# Patient Record
Sex: FEMALE | Race: WHITE | NOT HISPANIC OR LATINO | Employment: OTHER | URBAN - METROPOLITAN AREA
[De-identification: names, ages, dates, MRNs, and addresses within clinical notes are randomized per-mention and may not be internally consistent; named-entity substitution may affect disease eponyms.]

---

## 2017-01-02 ENCOUNTER — OFFICE VISIT (OUTPATIENT)
Dept: URGENT CARE | Facility: CLINIC | Age: 57
End: 2017-01-02
Payer: COMMERCIAL

## 2017-01-02 VITALS
HEART RATE: 80 BPM | OXYGEN SATURATION: 98 % | SYSTOLIC BLOOD PRESSURE: 120 MMHG | RESPIRATION RATE: 16 BRPM | BODY MASS INDEX: 22.26 KG/M2 | TEMPERATURE: 99 F | WEIGHT: 159 LBS | HEIGHT: 71 IN | DIASTOLIC BLOOD PRESSURE: 90 MMHG

## 2017-01-02 DIAGNOSIS — R68.89 FLU-LIKE SYMPTOMS: ICD-10-CM

## 2017-01-02 LAB
FLUAV+FLUBV AG SPEC QL IA: NEGATIVE
INT CON NEG: NEGATIVE
INT CON NEG: NEGATIVE
INT CON POS: POSITIVE
INT CON POS: POSITIVE
S PYO AG THROAT QL: NEGATIVE

## 2017-01-02 PROCEDURE — 99214 OFFICE O/P EST MOD 30 MIN: CPT | Performed by: PHYSICIAN ASSISTANT

## 2017-01-02 PROCEDURE — 87880 STREP A ASSAY W/OPTIC: CPT | Performed by: PHYSICIAN ASSISTANT

## 2017-01-02 PROCEDURE — 87804 INFLUENZA ASSAY W/OPTIC: CPT | Performed by: PHYSICIAN ASSISTANT

## 2017-01-02 RX ORDER — OSELTAMIVIR PHOSPHATE 75 MG/1
75 CAPSULE ORAL 2 TIMES DAILY
Qty: 10 CAP | Refills: 0 | Status: SHIPPED | OUTPATIENT
Start: 2017-01-02 | End: 2017-01-07

## 2017-01-02 ASSESSMENT — ENCOUNTER SYMPTOMS
MYALGIAS: 1
DIZZINESS: 0
WHEEZING: 0
SHORTNESS OF BREATH: 0
SPUTUM PRODUCTION: 0
CONSTIPATION: 0
SORE THROAT: 1
FEVER: 1
CHILLS: 1
DIARRHEA: 0
NAUSEA: 1
VOMITING: 0
ABDOMINAL PAIN: 0
DIAPHORESIS: 1
HEADACHES: 0
COUGH: 1

## 2017-01-02 NOTE — MR AVS SNAPSHOT
"Radha Mcfarlane   2017 12:15 PM   Office Visit   MRN: [id number]    Department:  [facility]   Dept Phone:  [phone]    Description:  Female : 1960   Provider:  Patricia Duran PA-C           Reason for Visit     Fever sore throat,nasal drip,x2day       Allergies as of 2017     No Known Allergies      You were diagnosed with     Flu-like symptoms   [401769]         Vital Signs     Blood Pressure Pulse Temperature Respirations Height Weight    120/90 mmHg 80 37.2 °C (99 °F) 16 1.803 m (5' 11\") 72.122 kg (159 lb)    Body Mass Index Oxygen Saturation Smoking Status             22.19 kg/m2 98% Never Smoker          Basic Information     Date Of Birth Sex Race Ethnicity Preferred Language    1960 Female White Non- English      Problem List              ICD-10-CM Priority Class Noted - Resolved    Marfan syndrome (Chronic) Q87.40   2010 - Present    Abnormal EKG R94.31   10/30/2012 - Present    Incomplete RBBB I45.10   10/30/2012 - Present    Thickened endometrium R93.8   2016 - Present    Encounter for monitoring oral hormonal therapy for heavy menses Z51.81, Z79.3, N92.0   2016 - Present      Health Maintenance        Date Due Completion Dates    IMM DTaP/Tdap/Td Vaccine (1 - Tdap) 1980 ---    COLONOSCOPY 2011 ---    IMM INFLUENZA (1) 2016, 2014    MAMMOGRAM 2017, 2014, 10/30/2012, 2010    PAP SMEAR 2019            Results     POCT Rapid Strep A      Component    Rapid Strep Screen    Negative    Internal Control Positive    Positive    Internal Control Negative    Negative                        Current Immunizations     Influenza Vaccine Quad Inj (Pf) 2015 11:05 AM, 2014      Below and/or attached are the medications your provider expects you to take. Review all of your home medications and newly ordered medications with your provider and/or pharmacist. Follow medication " instructions as directed by your provider and/or pharmacist. Please keep your medication list with you and share with your provider. Update the information when medications are discontinued, doses are changed, or new medications (including over-the-counter products) are added; and carry medication information at all times in the event of emergency situations     Allergies:  No Known Allergies          Medications  Valid as of: January 02, 2017 -  1:35 PM    Generic Name Brand Name Tablet Size Instructions for use    Acetaminophen (Tab) TYLENOL 325 MG Take 650 mg by mouth Once. Prior to Iron Dextran infusion        Cyclobenzaprine HCl (Tab) FLEXERIL 10 MG Take 1 Tab by mouth 3 times a day as needed.        Hydrocod Polst-Chlorphen Polst (Suspension Extended Release) TUSSIONEX 10-8 MG/5ML Take 5 mL by mouth every 12 hours.        Hydrocodone-Acetaminophen (Tab) NORCO 5-325 MG Take 1-2 Tabs by mouth every 6 hours as needed ((Pain Scale 7-10)).        Ibuprofen (Tab) MOTRIN 200 MG 1-3 PO Q6H PRN pain        Oseltamivir Phosphate (Cap) TAMIFLU 75 MG Take 1 Cap by mouth 2 times a day for 5 days.        .                 Medicines prescribed today were sent to:     [facility]/PHARMACY #[id number] - [place] - [address] [place] [address]    Phone: [phone] Fax: [phone]    Open 24 Hours?: No      Medication refill instructions:       If your prescription bottle indicates you have medication refills left, it is not necessary to call your provider’s office. Please contact your pharmacy and they will refill your medication.    If your prescription bottle indicates you do not have any refills left, you may request refills at any time through one of the following ways: The online [employer] system (except Urgent Care), by calling your provider’s office, or by asking your pharmacy to contact your provider’s office with a refill request. Medication refills are processed only during regular business hours and may not be  available until the next business day. Your provider may request additional information or to have a follow-up visit with you prior to refilling your medication.   *Please Note: Medication refills are assigned a new Rx number when refilled electronically. Your pharmacy may indicate that no refills were authorized even though a new prescription for the same medication is available at the pharmacy. Please request the medicine by name with the pharmacy before contacting your provider for a refill.           [employer] Access Code: Activation code not generated  Current [employer] Status: Active

## 2017-01-03 NOTE — PROGRESS NOTES
"Subjective:      Radha Mcfarlane is a 56 y.o. female who presents with Fever            Fever   This is a new problem. The current episode started yesterday. The problem has been gradually improving. The maximum temperature noted was 101 to 101.9 F. Associated symptoms include congestion, coughing, muscle aches, nausea and a sore throat. Pertinent negatives include no abdominal pain, diarrhea, headaches, vomiting or wheezing. She has tried acetaminophen for the symptoms. The treatment provided mild relief.   Pt reports severe body aches and difficulty sleeping since yesterday. She states she has not had a flu shot this year.     Review of Systems   Constitutional: Positive for fever, chills, malaise/fatigue and diaphoresis.   HENT: Positive for congestion and sore throat.    Respiratory: Positive for cough. Negative for sputum production, shortness of breath and wheezing.    Gastrointestinal: Positive for nausea. Negative for vomiting, abdominal pain, diarrhea and constipation.   Musculoskeletal: Positive for myalgias.   Neurological: Negative for dizziness and headaches.          Objective:     /90 mmHg  Pulse 80  Temp(Src) 37.2 °C (99 °F)  Resp 16  Ht 1.803 m (5' 11\")  Wt 72.122 kg (159 lb)  BMI 22.19 kg/m2  SpO2 98%     Physical Exam   Constitutional: She is oriented to person, place, and time. She appears well-developed and well-nourished. No distress.   HENT:   Head: Normocephalic and atraumatic.   Right Ear: External ear normal.   Left Ear: External ear normal.   Nose: Nose normal.   Mouth/Throat: No oropharyngeal exudate.   Oropharynx erythematous   Eyes: Pupils are equal, round, and reactive to light.   Neck: Normal range of motion.   Cardiovascular: Normal rate, regular rhythm and normal heart sounds.    No murmur heard.  Pulmonary/Chest: Effort normal and breath sounds normal. No respiratory distress. She has no wheezes. She has no rales.   Musculoskeletal: Normal range of motion. "   Neurological: She is alert and oriented to person, place, and time.   Skin: Skin is warm and dry. She is not diaphoretic.   Psychiatric: She has a normal mood and affect. Her behavior is normal.   Nursing note and vitals reviewed.         Medications, Allergies, and current problem list reviewed today in Epic       Assessment/Plan:     1. Flu-like symptoms  - POCT Rapid Strep A: NEGATIVE  - POCT Influenza A/B: NEGATIVE  - Will treat for flu based off clinical presentation  - oseltamivir (TAMIFLU) 75 MG Cap; Take 1 Cap by mouth 2 times a day for 5 days.  Dispense: 10 Cap; Refill: 0  - Hydrocod Polst-CPM Polst ER (TUSSIONEX) 10-8 MG/5ML Suspension Extended Release; Take 5 mL by mouth every 12 hours.  Dispense: 140 mL; Refill: 0   - Will cause sedation, avoid driving, operating heavy machinery, and drinking alcohol  - Increased fluids and rest  - OTC tylenol or ibuprofen as needed for fever control  - Call or return to office if symptoms persist or worsen

## 2017-01-10 ENCOUNTER — OFFICE VISIT (OUTPATIENT)
Dept: URGENT CARE | Facility: CLINIC | Age: 57
End: 2017-01-10
Payer: COMMERCIAL

## 2017-01-10 ENCOUNTER — APPOINTMENT (OUTPATIENT)
Dept: RADIOLOGY | Facility: IMAGING CENTER | Age: 57
End: 2017-01-10
Attending: PHYSICIAN ASSISTANT
Payer: COMMERCIAL

## 2017-01-10 VITALS
TEMPERATURE: 98.2 F | DIASTOLIC BLOOD PRESSURE: 82 MMHG | WEIGHT: 156.6 LBS | RESPIRATION RATE: 16 BRPM | HEART RATE: 70 BPM | HEIGHT: 71 IN | OXYGEN SATURATION: 97 % | BODY MASS INDEX: 21.92 KG/M2 | SYSTOLIC BLOOD PRESSURE: 126 MMHG

## 2017-01-10 DIAGNOSIS — J06.9 UPPER RESPIRATORY TRACT INFECTION, UNSPECIFIED TYPE: ICD-10-CM

## 2017-01-10 DIAGNOSIS — R05.9 COUGH: ICD-10-CM

## 2017-01-10 PROCEDURE — 71020 DX-CHEST-2 VIEWS: CPT | Mod: TC | Performed by: PHYSICIAN ASSISTANT

## 2017-01-10 PROCEDURE — 99214 OFFICE O/P EST MOD 30 MIN: CPT | Performed by: PHYSICIAN ASSISTANT

## 2017-01-10 RX ORDER — AZITHROMYCIN 250 MG/1
TABLET, FILM COATED ORAL
Qty: 6 TAB | Refills: 0 | Status: SHIPPED | OUTPATIENT
Start: 2017-01-10 | End: 2017-05-31

## 2017-01-10 RX ORDER — BENZONATATE 100 MG/1
100 CAPSULE ORAL 3 TIMES DAILY PRN
Qty: 60 CAP | Refills: 0 | Status: SHIPPED | OUTPATIENT
Start: 2017-01-10 | End: 2017-05-31

## 2017-01-10 RX ORDER — ALBUTEROL SULFATE 90 UG/1
2 AEROSOL, METERED RESPIRATORY (INHALATION) EVERY 6 HOURS PRN
Qty: 8.5 G | Refills: 1 | Status: SHIPPED | OUTPATIENT
Start: 2017-01-10 | End: 2019-03-27

## 2017-01-10 ASSESSMENT — ENCOUNTER SYMPTOMS
COUGH: 1
SPUTUM PRODUCTION: 1
DIARRHEA: 0
CHILLS: 0
DIZZINESS: 0
SHORTNESS OF BREATH: 0
MUSCULOSKELETAL NEGATIVE: 1
SORE THROAT: 0
NAUSEA: 0
WHEEZING: 0
VOMITING: 0
FEVER: 0
ABDOMINAL PAIN: 0

## 2017-01-10 NOTE — MR AVS SNAPSHOT
"        Radha Mcfarlane   1/10/2017 3:15 PM   Office Visit   MRN: [id number]    Department:  [facility]   Dept Phone:  [phone]    Description:  Female : 1960   Provider:  Patricia Duran PA-C           Reason for Visit     Cough x was seen last monday for flu symptoms, everything has gone away but still has a bad cough and chest congestion, heavy colored mucus production      Allergies as of 1/10/2017     No Known Allergies      You were diagnosed with     Upper respiratory tract infection, unspecified type   [0739959]         Vital Signs     Blood Pressure Pulse Temperature Respirations Height Weight    126/82 mmHg 70 36.8 °C (98.2 °F) 16 1.803 m (5' 11\") 71.033 kg (156 lb 9.6 oz)    Body Mass Index Oxygen Saturation Smoking Status             21.85 kg/m2 97% Never Smoker          Basic Information     Date Of Birth Sex Race Ethnicity Preferred Language    1960 Female White Non- English      Problem List              ICD-10-CM Priority Class Noted - Resolved    Marfan syndrome (Chronic) Q87.40   2010 - Present    Abnormal EKG R94.31   10/30/2012 - Present    Incomplete RBBB I45.10   10/30/2012 - Present    Thickened endometrium R93.8   2016 - Present    Encounter for monitoring oral hormonal therapy for heavy menses Z51.81, Z79.3, N92.0   2016 - Present      Health Maintenance        Date Due Completion Dates    IMM DTaP/Tdap/Td Vaccine (1 - Tdap) 1980 ---    COLONOSCOPY 2011 ---    IMM INFLUENZA (1) 2016, 2014    MAMMOGRAM 2017, 2014, 10/30/2012, 2010    PAP SMEAR 2019            Current Immunizations     Influenza Vaccine Quad Inj (Pf) 2015 11:05 AM, 2014      Below and/or attached are the medications your provider expects you to take. Review all of your home medications and newly ordered medications with your provider and/or pharmacist. Follow medication instructions as directed by " your provider and/or pharmacist. Please keep your medication list with you and share with your provider. Update the information when medications are discontinued, doses are changed, or new medications (including over-the-counter products) are added; and carry medication information at all times in the event of emergency situations     Allergies:  No Known Allergies          Medications  Valid as of: January 10, 2017 -  4:22 PM    Generic Name Brand Name Tablet Size Instructions for use    Acetaminophen (Tab) TYLENOL 325 MG Take 650 mg by mouth Once. Prior to Iron Dextran infusion        Albuterol Sulfate (Aero Soln) albuterol 108 (90 BASE) MCG/ACT Inhale 2 Puffs by mouth every 6 hours as needed for Shortness of Breath.        Azithromycin (Tab) ZITHROMAX 250 MG Take as directed        Benzonatate (Cap) TESSALON 100 MG Take 1 Cap by mouth 3 times a day as needed for Cough.        Cyclobenzaprine HCl (Tab) FLEXERIL 10 MG Take 1 Tab by mouth 3 times a day as needed.        Hydrocod Polst-Chlorphen Polst (Suspension Extended Release) TUSSIONEX 10-8 MG/5ML Take 5 mL by mouth every 12 hours.        Hydrocodone-Acetaminophen (Tab) NORCO 5-325 MG Take 1-2 Tabs by mouth every 6 hours as needed ((Pain Scale 7-10)).        Ibuprofen (Tab) MOTRIN 200 MG 1-3 PO Q6H PRN pain        .                 Medicines prescribed today were sent to:     [facility]/PHARMACY #[id number] - [place] - [address] [place] [address]    Phone: [phone] Fax: [phone]    Open 24 Hours?: No      Medication refill instructions:       If your prescription bottle indicates you have medication refills left, it is not necessary to call your provider’s office. Please contact your pharmacy and they will refill your medication.    If your prescription bottle indicates you do not have any refills left, you may request refills at any time through one of the following ways: The online [employer] system (except Urgent Care), by calling your provider’s  office, or by asking your pharmacy to contact your provider’s office with a refill request. Medication refills are processed only during regular business hours and may not be available until the next business day. Your provider may request additional information or to have a follow-up visit with you prior to refilling your medication.   *Please Note: Medication refills are assigned a new Rx number when refilled electronically. Your pharmacy may indicate that no refills were authorized even though a new prescription for the same medication is available at the pharmacy. Please request the medicine by name with the pharmacy before contacting your provider for a refill.        Your To Do List     Future Labs/Procedures Complete By Expires    DX-CHEST-2 VIEWS  As directed 1/10/2018         [employer] Access Code: Activation code not generated  Current [employer] Status: Active

## 2017-01-11 NOTE — PROGRESS NOTES
"Subjective:      Radha Mcfarlane is a 57 y.o. female who presents with Cough            Cough  This is a new problem. The current episode started 1 to 4 weeks ago (1.5 weeks). The problem has been gradually worsening. The problem occurs every few minutes. The cough is productive of sputum (green). Pertinent negatives include no chest pain, chills, ear pain, fever, sore throat, shortness of breath or wheezing. The symptoms are aggravated by lying down. She has tried prescription cough suppressant for the symptoms. The treatment provided moderate relief. Her past medical history is significant for asthma. There is no history of pneumonia.   Pt was seen in urgent care 8 days ago for the same problem. She was having body aches, fever, and chills with a dry cough. She was treated empirically with Tamiflu and finished the rx as prescribed. States her symptoms of body aches and fever went away after a couple days, but her cough has gotten progressively worse and more productive. Denies recent fevers, SOB, or history of pneumonia.     Review of Systems   Constitutional: Negative for fever and chills.   HENT: Negative for ear pain and sore throat.    Respiratory: Positive for cough and sputum production. Negative for shortness of breath and wheezing.    Cardiovascular: Negative for chest pain.   Gastrointestinal: Negative for nausea, vomiting, abdominal pain and diarrhea.   Genitourinary: Negative.    Musculoskeletal: Negative.    Neurological: Negative for dizziness.          Objective:     /82 mmHg  Pulse 70  Temp(Src) 36.8 °C (98.2 °F)  Resp 16  Ht 1.803 m (5' 11\")  Wt 71.033 kg (156 lb 9.6 oz)  BMI 21.85 kg/m2  SpO2 97%     Physical Exam   Constitutional: She is oriented to person, place, and time. She appears well-developed and well-nourished. No distress.   HENT:   Head: Normocephalic and atraumatic.   Right Ear: External ear normal.   Left Ear: External ear normal.   Nose: Nose normal. "   Mouth/Throat: Oropharynx is clear and moist. No oropharyngeal exudate.   Eyes: Pupils are equal, round, and reactive to light. Right eye exhibits no discharge. Left eye exhibits no discharge.   Neck: Normal range of motion.   Cardiovascular: Normal rate, regular rhythm and normal heart sounds.    Pulmonary/Chest: Effort normal and breath sounds normal. No respiratory distress. She has no wheezes.   Musculoskeletal: Normal range of motion.   Neurological: She is alert and oriented to person, place, and time.   Skin: Skin is warm and dry. She is not diaphoretic.   Psychiatric: She has a normal mood and affect. Her behavior is normal.   Nursing note and vitals reviewed.         Medications, Allergies, and current problem list reviewed today in Epic       Assessment/Plan:     1. Upper respiratory tract infection, unspecified type  - DX-CHEST-2 VIEWS; Future   1.  No acute cardiopulmonary abnormality identified.   2.  Chronic obstructive pulmonary disease  - azithromycin (ZITHROMAX) 250 MG Tab; Take as directed  Dispense: 6 Tab; Refill: 0   - Complete full course of antibiotics as prescribed   - benzonatate (TESSALON) 100 MG Cap; Take 1 Cap by mouth 3 times a day as needed for Cough.  Dispense: 60 Cap; Refill: 0  - Call or return to office if symptoms persist or worsen

## 2017-02-04 ENCOUNTER — OFFICE VISIT (OUTPATIENT)
Dept: URGENT CARE | Facility: CLINIC | Age: 57
End: 2017-02-04
Payer: COMMERCIAL

## 2017-02-04 VITALS
DIASTOLIC BLOOD PRESSURE: 78 MMHG | HEART RATE: 71 BPM | OXYGEN SATURATION: 97 % | WEIGHT: 155 LBS | BODY MASS INDEX: 21.63 KG/M2 | SYSTOLIC BLOOD PRESSURE: 116 MMHG | RESPIRATION RATE: 16 BRPM | TEMPERATURE: 97.5 F

## 2017-02-04 DIAGNOSIS — J40 BRONCHITIS: ICD-10-CM

## 2017-02-04 DIAGNOSIS — J32.9 OTHER SINUSITIS: ICD-10-CM

## 2017-02-04 PROCEDURE — 99214 OFFICE O/P EST MOD 30 MIN: CPT | Performed by: PHYSICIAN ASSISTANT

## 2017-02-04 RX ORDER — MOXIFLOXACIN HYDROCHLORIDE 400 MG/1
400 TABLET ORAL DAILY
Qty: 10 TAB | Refills: 0 | Status: SHIPPED | OUTPATIENT
Start: 2017-02-04 | End: 2017-02-14

## 2017-02-04 ASSESSMENT — ENCOUNTER SYMPTOMS
CARDIOVASCULAR NEGATIVE: 1
COUGH: 1
SPUTUM PRODUCTION: 1
FEVER: 0
SHORTNESS OF BREATH: 0
SORE THROAT: 0
CONSTITUTIONAL NEGATIVE: 1
EYES NEGATIVE: 1
RHINORRHEA: 1

## 2017-02-04 NOTE — MR AVS SNAPSHOT
Radha Mcfarlane   2017 11:15 AM   Office Visit   MRN: [id number]    Department:  [facility]   Dept Phone:  [phone]    Description:  Female : 1960   Provider:  Sivakumar Galvan PA-C           Reason for Visit     Cough x 1 month, some productive cough    Sinus Problem x 1 month, nasal congestion, post nasal drip       Allergies as of 2017     No Known Allergies      You were diagnosed with     Bronchitis   [[id number]]       Other sinusitis   [[id number]]         Vital Signs     Blood Pressure Pulse Temperature Respirations Weight Oxygen Saturation    116/78 mmHg 71 36.4 °C (97.5 °F) 16 70.308 kg (155 lb) 97%    Smoking Status                   Never Smoker            Basic Information     Date Of Birth Sex Race Ethnicity Preferred Language    1960 Female White Non- English      Problem List              ICD-10-CM Priority Class Noted - Resolved    Marfan syndrome (Chronic) Q87.40   2010 - Present    Abnormal EKG R94.31   10/30/2012 - Present    Incomplete RBBB I45.10   10/30/2012 - Present    Thickened endometrium R93.8   2016 - Present    Encounter for monitoring oral hormonal therapy for heavy menses Z51.81, Z79.3, N92.0   2016 - Present      Health Maintenance        Date Due Completion Dates    IMM DTaP/Tdap/Td Vaccine (1 - Tdap) 1980 ---    COLONOSCOPY 2011 ---    IMM INFLUENZA (1) 2016, 2014    MAMMOGRAM 2017, 2014, 10/30/2012, 2010    PAP SMEAR 2019            Current Immunizations     Influenza Vaccine Quad Inj (Pf) 2015 11:05 AM, 2014      Below and/or attached are the medications your provider expects you to take. Review all of your home medications and newly ordered medications with your provider and/or pharmacist. Follow medication instructions as directed by your provider and/or pharmacist. Please keep your medication list with you and share with your provider. Update the  information when medications are discontinued, doses are changed, or new medications (including over-the-counter products) are added; and carry medication information at all times in the event of emergency situations     Allergies:  No Known Allergies          Medications  Valid as of: February 04, 2017 - 12:16 PM    Generic Name Brand Name Tablet Size Instructions for use    Acetaminophen (Tab) TYLENOL 325 MG Take 650 mg by mouth Once. Prior to Iron Dextran infusion        Albuterol Sulfate (Aero Soln) albuterol 108 (90 BASE) MCG/ACT Inhale 2 Puffs by mouth every 6 hours as needed for Shortness of Breath.        Azithromycin (Tab) ZITHROMAX 250 MG Take as directed        Benzonatate (Cap) TESSALON 100 MG Take 1 Cap by mouth 3 times a day as needed for Cough.        Cyclobenzaprine HCl (Tab) FLEXERIL 10 MG Take 1 Tab by mouth 3 times a day as needed.        Hydrocod Polst-Chlorphen Polst (Suspension Extended Release) TUSSIONEX 10-8 MG/5ML Take 5 mL by mouth every 12 hours.        Hydrocodone-Acetaminophen (Tab) NORCO 5-325 MG Take 1-2 Tabs by mouth every 6 hours as needed ((Pain Scale 7-10)).        Ibuprofen (Tab) MOTRIN 200 MG 1-3 PO Q6H PRN pain        .                 Medicines prescribed today were sent to:     [facility]/PHARMACY #[id number] - [place] - [address] [address]    Phone: [phone] Fax: [phone]    Open 24 Hours?: No      Medication refill instructions:       If your prescription bottle indicates you have medication refills left, it is not necessary to call your provider’s office. Please contact your pharmacy and they will refill your medication.    If your prescription bottle indicates you do not have any refills left, you may request refills at any time through one of the following ways: The online [employer] system (except Urgent Care), by calling your provider’s office, or by asking your pharmacy to contact your provider’s office with a refill request. Medication refills are  processed only during regular business hours and may not be available until the next business day. Your provider may request additional information or to have a follow-up visit with you prior to refilling your medication.   *Please Note: Medication refills are assigned a new Rx number when refilled electronically. Your pharmacy may indicate that no refills were authorized even though a new prescription for the same medication is available at the pharmacy. Please request the medicine by name with the pharmacy before contacting your provider for a refill.           [employer] Access Code: Activation code not generated  Current [employer] Status: Active

## 2017-02-04 NOTE — PROGRESS NOTES
Subjective:      Radha Mcfarlane is a 57 y.o. female who presents with Cough and Sinus Problem            Cough  This is a new problem. The current episode started in the past 7 days. The problem has been unchanged. The problem occurs every few minutes. The cough is productive of sputum. Associated symptoms include nasal congestion and rhinorrhea. Pertinent negatives include no fever, sore throat or shortness of breath. Nothing aggravates the symptoms. She has tried nothing for the symptoms. The treatment provided no relief. There is no history of asthma or environmental allergies.   Sinus Problem  Associated symptoms include congestion and coughing. Pertinent negatives include no shortness of breath or sore throat.       Review of Systems   Constitutional: Negative.  Negative for fever.   HENT: Positive for congestion and rhinorrhea. Negative for sore throat.    Eyes: Negative.    Respiratory: Positive for cough and sputum production. Negative for shortness of breath.    Cardiovascular: Negative.    Skin: Negative.    Endo/Heme/Allergies: Negative for environmental allergies.          Objective:     /78 mmHg  Pulse 71  Temp(Src) 36.4 °C (97.5 °F)  Resp 16  Wt 70.308 kg (155 lb)  SpO2 97%     Physical Exam   Constitutional: She is oriented to person, place, and time. She appears well-developed and well-nourished. No distress.   HENT:   Head: Normocephalic and atraumatic.   +maxill.sinus press.     Eyes: EOM are normal. Pupils are equal, round, and reactive to light.   Neck: Normal range of motion. Neck supple.   Cardiovascular: Normal rate.    Pulmonary/Chest: Effort normal and breath sounds normal. No respiratory distress. She has no wheezes. She has no rales.   Lymphadenopathy:     She has no cervical adenopathy.   Neurological: She is alert and oriented to person, place, and time.   Skin: Skin is warm and dry.   Psychiatric: She has a normal mood and affect. Her behavior is normal. Judgment and  thought content normal.   Nursing note and vitals reviewed.    Filed Vitals:    02/04/17 1136   BP: 116/78   Pulse: 71   Temp: 36.4 °C (97.5 °F)   Resp: 16   Weight: 70.308 kg (155 lb)   SpO2: 97%     Active Ambulatory Problems     Diagnosis Date Noted   • Marfan syndrome 09/16/2010   • Abnormal EKG 10/30/2012   • Incomplete RBBB 10/30/2012   • Thickened endometrium 04/29/2016   • Encounter for monitoring oral hormonal therapy for heavy menses 04/29/2016     Resolved Ambulatory Problems     Diagnosis Date Noted   • No Resolved Ambulatory Problems     Past Medical History   Diagnosis Date   • Asthma    • Breath shortness    • Anesthesia    • Gynecological disorder      Current Outpatient Prescriptions on File Prior to Visit   Medication Sig Dispense Refill   • azithromycin (ZITHROMAX) 250 MG Tab Take as directed 6 Tab 0   • benzonatate (TESSALON) 100 MG Cap Take 1 Cap by mouth 3 times a day as needed for Cough. 60 Cap 0   • albuterol 108 (90 BASE) MCG/ACT Aero Soln inhalation aerosol Inhale 2 Puffs by mouth every 6 hours as needed for Shortness of Breath. 8.5 g 1   • Hydrocod Polst-CPM Polst ER (TUSSIONEX) 10-8 MG/5ML Suspension Extended Release Take 5 mL by mouth every 12 hours. 140 mL 0   • hydrocodone-acetaminophen (NORCO) 5-325 MG Tab per tablet Take 1-2 Tabs by mouth every 6 hours as needed ((Pain Scale 7-10)). 20 Tab 0   • ibuprofen (MOTRIN) 200 MG Tab 1-3 PO Q6H PRN pain     • cyclobenzaprine (FLEXERIL) 10 MG Tab Take 1 Tab by mouth 3 times a day as needed. 30 Tab 0   • acetaminophen (TYLENOL) 325 MG TABS Take 650 mg by mouth Once. Prior to Iron Dextran infusion       No current facility-administered medications on file prior to visit.     Gargles, Cepacol lozenges, Aleve/Advil as needed for throat pain  Family History   Problem Relation Age of Onset   • Cancer Mother      Review of patient's allergies indicates no known allergies.              Assessment/Plan:     ·  bronchitis  · Sinusitis      · Avelox; otc  prn

## 2017-02-06 ENCOUNTER — OFFICE VISIT (OUTPATIENT)
Dept: INTERNAL MEDICINE | Facility: MEDICAL CENTER | Age: 57
End: 2017-02-06
Payer: COMMERCIAL

## 2017-02-06 VITALS
DIASTOLIC BLOOD PRESSURE: 84 MMHG | WEIGHT: 153.2 LBS | HEIGHT: 71 IN | OXYGEN SATURATION: 95 % | TEMPERATURE: 97.9 F | BODY MASS INDEX: 21.45 KG/M2 | SYSTOLIC BLOOD PRESSURE: 152 MMHG | HEART RATE: 73 BPM

## 2017-02-06 DIAGNOSIS — R05.8 POST-VIRAL COUGH SYNDROME: ICD-10-CM

## 2017-02-06 PROCEDURE — 99213 OFFICE O/P EST LOW 20 MIN: CPT | Mod: GE | Performed by: INTERNAL MEDICINE

## 2017-02-06 RX ORDER — PREDNISONE 20 MG/1
20 TABLET ORAL DAILY
Qty: 5 TAB | Refills: 0 | Status: SHIPPED | OUTPATIENT
Start: 2017-02-06 | End: 2017-02-11

## 2017-02-06 ASSESSMENT — PAIN SCALES - GENERAL: PAINLEVEL: 3=SLIGHT PAIN

## 2017-02-06 ASSESSMENT — PATIENT HEALTH QUESTIONNAIRE - PHQ9: CLINICAL INTERPRETATION OF PHQ2 SCORE: 0

## 2017-02-06 NOTE — MR AVS SNAPSHOT
"Radha Mcfarlane   2017 3:15 PM   Office Visit   MRN: [id number]    Department:  [facility]   Dept Phone:  [phone]    Description:  Female : 1960   Provider:  Yunior Strasus M.D.           Reason for Visit     Cough x1 month      Allergies as of 2017     No Known Allergies      You were diagnosed with     Post-viral cough syndrome   [139925]         Vital Signs     Blood Pressure Pulse Temperature Height Weight Body Mass Index    152/84 mmHg 73 36.6 °C (97.9 °F) 1.803 m (5' 11\") 69.491 kg (153 lb 3.2 oz) 21.38 kg/m2    Oxygen Saturation Smoking Status                95% Never Smoker           Basic Information     Date Of Birth Sex Race Ethnicity Preferred Language    1960 Female White Non- English      Problem List              ICD-10-CM Priority Class Noted - Resolved    Marfan syndrome (Chronic) Q87.40   2010 - Present    Abnormal EKG R94.31   10/30/2012 - Present    Incomplete RBBB I45.10   10/30/2012 - Present    Thickened endometrium R93.8   2016 - Present    Encounter for monitoring oral hormonal therapy for heavy menses Z51.81, Z79.3, N92.0   2016 - Present      Health Maintenance        Date Due Completion Dates    IMM DTaP/Tdap/Td Vaccine (1 - Tdap) 1980 ---    COLONOSCOPY 2011 ---    IMM INFLUENZA (1) 2016, 2014    MAMMOGRAM 2017, 2014, 10/30/2012, 2010    PAP SMEAR 2019            Current Immunizations     Influenza Vaccine Quad Inj (Pf) 2015 11:05 AM, 2014      Below and/or attached are the medications your provider expects you to take. Review all of your home medications and newly ordered medications with your provider and/or pharmacist. Follow medication instructions as directed by your provider and/or pharmacist. Please keep your medication list with you and share with your provider. Update the information when medications are discontinued, doses are " changed, or new medications (including over-the-counter products) are added; and carry medication information at all times in the event of emergency situations     Allergies:  No Known Allergies          Medications  Valid as of: February 06, 2017 -  3:55 PM    Generic Name Brand Name Tablet Size Instructions for use    Acetaminophen (Tab) TYLENOL 325 MG Take 650 mg by mouth Once. Prior to Iron Dextran infusion        Albuterol Sulfate (Aero Soln) albuterol 108 (90 BASE) MCG/ACT Inhale 2 Puffs by mouth every 6 hours as needed for Shortness of Breath.        Azithromycin (Tab) ZITHROMAX 250 MG Take as directed        Benzonatate (Cap) TESSALON 100 MG Take 1 Cap by mouth 3 times a day as needed for Cough.        Cyclobenzaprine HCl (Tab) FLEXERIL 10 MG Take 1 Tab by mouth 3 times a day as needed.        Dextromethorphan-Guaifenesin (Liquid) Dextromethorphan-Guaifenesin  MG/5ML Take 5 mL by mouth 4 times a day as needed.        Hydrocod Polst-Chlorphen Polst (Suspension Extended Release) TUSSIONEX 10-8 MG/5ML Take 5 mL by mouth every 12 hours.        Hydrocodone-Acetaminophen (Tab) NORCO 5-325 MG Take 1-2 Tabs by mouth every 6 hours as needed ((Pain Scale 7-10)).        Ibuprofen (Tab) MOTRIN 200 MG 1-3 PO Q6H PRN pain        Moxifloxacin HCl (Tab) AVELOX 400 MG Take 1 Tab by mouth every day for 10 days.        PredniSONE (Tab) DELTASONE 20 MG Take 1 Tab by mouth every day for 5 days.        .                 Medicines prescribed today were sent to:     [facility]ARMACY #[id number] - [place] - [address] [place] [address]    Phone: [phone] Fax: [phone]    Open 24 Hours?: No      Medication refill instructions:       If your prescription bottle indicates you have medication refills left, it is not necessary to call your provider’s office. Please contact your pharmacy and they will refill your medication.    If your prescription bottle indicates you do not have any refills left, you may request  refills at any time through one of the following ways: The online [employer] system (except Urgent Care), by calling your provider’s office, or by asking your pharmacy to contact your provider’s office with a refill request. Medication refills are processed only during regular business hours and may not be available until the next business day. Your provider may request additional information or to have a follow-up visit with you prior to refilling your medication.   *Please Note: Medication refills are assigned a new Rx number when refilled electronically. Your pharmacy may indicate that no refills were authorized even though a new prescription for the same medication is available at the pharmacy. Please request the medicine by name with the pharmacy before contacting your provider for a refill.           [employer] Access Code: Activation code not generated  Current [employer] Status: Active

## 2017-02-06 NOTE — PROGRESS NOTES
Established Patient    Radha presents today with the following:    CC:   Chief Complaint   Patient presents with   • Cough     x1 month     Acute Issues:   1. URI  She had symptoms of URI/flu in January which improved with Tamiflu. The cough continued to linger and she was given Z-jacob with some improvement in her cough. She was seen at urgent care Saturday and given a prescription for moxifloxacin which has not improved her symptoms. She denies any associated fevers, chills, night sweats. She was given codeine syrup which helps but does not want to keep using codeine. Reports history of asthma but hardly ever needs to use her inhaler.      Patient Active Problem List    Diagnosis Date Noted   • Thickened endometrium 04/29/2016   • Encounter for monitoring oral hormonal therapy for heavy menses 04/29/2016   • Abnormal EKG 10/30/2012   • Incomplete RBBB 10/30/2012   • Marfan syndrome 09/16/2010       Current Outpatient Prescriptions   Medication Sig Dispense Refill   • predniSONE (DELTASONE) 20 MG Tab Take 1 Tab by mouth every day for 5 days. 5 Tab 0   • Dextromethorphan-Guaifenesin  MG/5ML Liquid Take 5 mL by mouth 4 times a day as needed. 1 Bottle 1   • moxifloxacin (AVELOX) 400 MG Tab Take 1 Tab by mouth every day for 10 days. 10 Tab 0   • benzonatate (TESSALON) 100 MG Cap Take 1 Cap by mouth 3 times a day as needed for Cough. 60 Cap 0   • albuterol 108 (90 BASE) MCG/ACT Aero Soln inhalation aerosol Inhale 2 Puffs by mouth every 6 hours as needed for Shortness of Breath. 8.5 g 1   • Hydrocod Polst-CPM Polst ER (TUSSIONEX) 10-8 MG/5ML Suspension Extended Release Take 5 mL by mouth every 12 hours. 140 mL 0   • azithromycin (ZITHROMAX) 250 MG Tab Take as directed 6 Tab 0   • hydrocodone-acetaminophen (NORCO) 5-325 MG Tab per tablet Take 1-2 Tabs by mouth every 6 hours as needed ((Pain Scale 7-10)). 20 Tab 0   • ibuprofen (MOTRIN) 200 MG Tab 1-3 PO Q6H PRN pain     • cyclobenzaprine (FLEXERIL) 10 MG Tab Take 1  "Tab by mouth 3 times a day as needed. 30 Tab 0   • acetaminophen (TYLENOL) 325 MG TABS Take 650 mg by mouth Once. Prior to Iron Dextran infusion       No current facility-administered medications for this visit.       Allergies:No Known Allergies    ROS  Constitutional: Denies fevers or chills  Eyes: Denies changes in vision  Ears/Nose/Throat/Mouth: Denies nasal congestion or sore throat   Cardiovascular: Denies chest pain or palpitations   Respiratory: Denies shortness of breath  Gastrointestinal/Hepatic: Denies abd pain, nausea, vomiting   Neurological: Denies headache    /84 mmHg  Pulse 73  Temp(Src) 36.6 °C (97.9 °F)  Ht 1.803 m (5' 11\")  Wt 69.491 kg (153 lb 3.2 oz)  BMI 21.38 kg/m2  SpO2 95%    Physical Exam  General: non-toxic apeparnce. NAD.   HEENT: EOMI. Scleral clear. No tonsillar erythema or exudates.  CVS: normal S1, S2. NSR. No m/r/g. No JVD.   PULM: CTABL . No w/r/r. No basilar crackles.   ABD: soft, NT, ND. +BS.   : No suprapubic tenderness. No CVA tenderness.   EXT: no LE edema b/l. No cyanosis.  Neuro: No focal deficits.   Pysch: AAOx4  Skin: no rashes.     Assessment and Plan  1. Post-viral cough syndrome  Patient symptoms suggestive of postviral cough syndrome. The patient was educated that her symptoms can persist for 6-8 weeks. Her chest x-ray done on 17 January was reviewed and showed no acute cardiopulmonary processes. Advised the patient that she does not need antibiotics as she does not have any symptoms to suggest bacterial infection (i.e. Sinusitis/pneumonia/pharyngitis). I agree that she should not continue taking cough syrup with codeine. Robitussin during the day and NyQuil at night would be a good substitute. Will also try a 5 day course of prednisone to help with possible underlying mild asthma flare. She was advised to return to the clinic should she develop any symptoms of developing/worsening bacterial infection.  - predniSONE (DELTASONE) 20 MG Tab; Take 1 Tab by " mouth every day for 5 days.  Dispense: 5 Tab; Refill: 0  - Dextromethorphan-Guaifenesin  MG/5ML Liquid; Take 5 mL by mouth 4 times a day as needed.  Dispense: 1 Bottle; Refill: 1    Follow-up:Return if symptoms worsen or fail to improve.    Signed by: Yunior Strauss M.D.

## 2017-02-13 VITALS
SYSTOLIC BLOOD PRESSURE: 110 MMHG | HEART RATE: 66 BPM | DIASTOLIC BLOOD PRESSURE: 78 MMHG | WEIGHT: 154.6 LBS | HEIGHT: 71 IN | BODY MASS INDEX: 21.65 KG/M2

## 2017-02-13 DIAGNOSIS — R91.1 PULMONARY NODULE: ICD-10-CM

## 2017-02-13 DIAGNOSIS — M54.2 NECK PAIN: ICD-10-CM

## 2017-02-13 DIAGNOSIS — Z80.3 FH: BREAST CANCER: ICD-10-CM

## 2017-02-13 DIAGNOSIS — N92.4 PERIMENOPAUSAL MENORRHAGIA: ICD-10-CM

## 2017-02-13 PROBLEM — D72.819 LEUKOPENIA: Status: ACTIVE | Noted: 2017-02-13

## 2017-02-13 PROBLEM — M19.90 OSTEOARTHRITIS: Status: ACTIVE | Noted: 2017-02-13

## 2017-02-13 PROBLEM — D64.9 ANEMIA: Status: ACTIVE | Noted: 2017-02-13

## 2017-05-30 PROBLEM — D72.819 LEUKOPENIA: Status: RESOLVED | Noted: 2017-02-13 | Resolved: 2017-05-30

## 2017-05-30 PROBLEM — D64.9 ANEMIA: Status: RESOLVED | Noted: 2017-02-13 | Resolved: 2017-05-30

## 2017-05-30 PROBLEM — E78.5 DYSLIPIDEMIA: Status: ACTIVE | Noted: 2017-05-30

## 2017-05-31 ENCOUNTER — OFFICE VISIT (OUTPATIENT)
Dept: INTERNAL MEDICINE | Facility: MEDICAL CENTER | Age: 57
End: 2017-05-31
Payer: COMMERCIAL

## 2017-05-31 VITALS
OXYGEN SATURATION: 99 % | HEIGHT: 71 IN | BODY MASS INDEX: 22.26 KG/M2 | DIASTOLIC BLOOD PRESSURE: 82 MMHG | TEMPERATURE: 98.3 F | SYSTOLIC BLOOD PRESSURE: 100 MMHG | HEART RATE: 57 BPM | WEIGHT: 159 LBS

## 2017-05-31 DIAGNOSIS — Z78.0 POST-MENOPAUSE: ICD-10-CM

## 2017-05-31 DIAGNOSIS — Q87.40 MARFAN SYNDROME: Chronic | ICD-10-CM

## 2017-05-31 DIAGNOSIS — Z00.00 HEALTH CARE MAINTENANCE: ICD-10-CM

## 2017-05-31 DIAGNOSIS — I45.10 INCOMPLETE RBBB: ICD-10-CM

## 2017-05-31 DIAGNOSIS — M54.2 NECK PAIN: ICD-10-CM

## 2017-05-31 DIAGNOSIS — G89.29 CHRONIC RIGHT SHOULDER PAIN: ICD-10-CM

## 2017-05-31 DIAGNOSIS — M17.0 OSTEOARTHRITIS OF BOTH KNEES, UNSPECIFIED OSTEOARTHRITIS TYPE: ICD-10-CM

## 2017-05-31 DIAGNOSIS — M62.830 BACK SPASM: ICD-10-CM

## 2017-05-31 DIAGNOSIS — M25.511 CHRONIC RIGHT SHOULDER PAIN: ICD-10-CM

## 2017-05-31 DIAGNOSIS — Z80.3 FH: BREAST CANCER: ICD-10-CM

## 2017-05-31 DIAGNOSIS — E78.5 DYSLIPIDEMIA: ICD-10-CM

## 2017-05-31 PROBLEM — N92.4 PERIMENOPAUSAL MENORRHAGIA: Status: RESOLVED | Noted: 2017-02-13 | Resolved: 2017-05-31

## 2017-05-31 PROCEDURE — 99396 PREV VISIT EST AGE 40-64: CPT | Performed by: INTERNAL MEDICINE

## 2017-05-31 NOTE — PROGRESS NOTES
Established Patient    Radha Mcfarlane is a 57 y.o. female who presents today with the following:    CC: Here for routine follow-up. Is having some hot flashes and some improving right shoulder pain    HPI: Question about the diagnosis of Marfan syndrome    Marfan syndrome  No symptoms    Health care maintenance  Hot yoga and pilates and walking  Diet good.     Post-menopause  Mostly night sweats- up a lot- but no desire for treatment- fine during daytime.     Incomplete RBBB  Never any cardiac issues.     FH: breast cancer  Reg SBE- no probs.     Right shoulder pain  Injured couple years ago- still problem with side plank- but overall getting stronger.     Neck pain  Better with yoga    Osteoarthritis  knees- does better with hot yoga.     Dyslipidemia  Patient denies any signs/symptoms of cardiovascular disease. Denies chest pain/pressure; denies numbness/weakness or difficulty with speech/swallowing or sudden change in vision.       Back spasm  Five spasms in past three years- UC>> muscle relaxer and Oxy>>resolves quickly.         ROS: As per HPI. Additional pertinent symptoms as noted below.    ROS:  Constitutional ROS: No unexpected change in weight, No weakness, No fatigue  Eye ROS: No recent significant change in vision  Pulmonary ROS: No shortness of breath, No recent change in breathing  Cardiovascular ROS: No chest pain, No edema, No palpitations, No syncope  Gastrointestinal ROS: No abdominal pain, No nausea, vomiting, diarrhea, or constipation  Psychiatric ROS: No depression, No anxiety    Patient Active Problem List    Diagnosis Date Noted   • Health care maintenance 05/31/2017   • Post-menopause 05/31/2017   • Right shoulder pain 05/31/2017   • Back spasm 05/31/2017   • Dyslipidemia 05/30/2017   • FH: breast cancer 02/13/2017   • Pulmonary nodule 02/13/2017   • Neck pain 02/13/2017   • Osteoarthritis 02/13/2017   • Incomplete RBBB 10/30/2012   • Marfan syndrome 09/16/2010       Social  "History   Substance Use Topics   • Smoking status: Never Smoker    • Smokeless tobacco: Never Used   • Alcohol Use: 3.6 oz/week     6 Glasses of wine per week       Current Outpatient Prescriptions   Medication Sig Dispense Refill   • albuterol 108 (90 BASE) MCG/ACT Aero Soln inhalation aerosol Inhale 2 Puffs by mouth every 6 hours as needed for Shortness of Breath. 8.5 g 1   • ibuprofen (MOTRIN) 200 MG Tab 1-3 PO Q6H PRN pain     • acetaminophen (TYLENOL) 325 MG TABS Take 650 mg by mouth Once. Prior to Iron Dextran infusion       No current facility-administered medications for this visit.       /82 mmHg  Pulse 57  Temp(Src) 36.8 °C (98.3 °F)  Ht 1.803 m (5' 11\")  Wt 72.122 kg (159 lb)  BMI 22.19 kg/m2  SpO2 99%    Physical Exam  General: Well developed, well nourished female, in no distress.  Eyes: Conjuntiva without any obvious injection or erythema.   Ears- canals and TMs clear  Mouth- mucous membranes moist, no erythema  Cardiovascular: Heart is regular with no murmurs, no bruits  Lungs: Clear to auscultation bilaterally. No wheezes, rhonci or crackles heard. Respiratory effort is normal.  Abd: Soft, non-tender  Ext: No edema  Other: Shoulders with full range of motion      Assessment and Plan      1. Health care maintenance  Routine screening up to date.  Diet and exercise are good    2. Marfan syndrome  She does have some of the physical features of Marfan syndrome. Was told this was a likely diagnosis by her previous physician. We discussed seeing a  to do additional evaluation at this time. Her biggest concerns are the cardiac issues. She was told by doctor Fletcher 5 years ago that she should just do a follow-up every 10 years with an echo. This was fine last time she had it done. (5 years ago) but will refer again at this point in time to see if there is anything further that ought to be done to either rule in or rule out the diagnosis  - REFERRAL TO CARDIOLOGY    3. " Post-menopause  With some symptoms particularly at nighttime. Discussed nonpharmacologic strategies. If symptoms become intolerable she is to let me know. Would not want to do any ERT due to her family history of breast cancer. We discussed using SSRIs    4. Incomplete RBBB  Per cardiology note from her previous EKG    5. FH: breast cancer  Up-to-date with mammograms and self breast exam    6. Chronic right shoulder pain  Patient has just recently increased her exercise. Hopefully things will continue to improve and if not we can do some physical therapy    7. Neck pain  Resolved with exercise    8. Osteoarthritis of both knees, unspecified osteoarthritis type  Does fine as long as she does the hot yoga. No locking or give way.  - CBC WITH DIFFERENTIAL; Future    9. Dyslipidemia  Not on meds and no other risks for cardiovascular disease. We'll just repeat labs at this time  - COMP METABOLIC PANEL; Future  - LIPID PROFILE; Future    10. Back spasm  Patient reports urgent care visits for back spasm one to 2 times per year. In that muscle relaxers and oxycodone relieve her symptoms. She often needs to take them for just one or 2 days. I recommended trying to work this out with her exercises and stretching and her improved core strength rather than using narcotics for acute back pain.      Return in about 1 year (around 5/31/2018).      Signed by: Bushra Acosta M.D.    This note was created using voice recognition software. There may be unintended errors in spelling, grammar or content.

## 2017-05-31 NOTE — MR AVS SNAPSHOT
"        Radha Mcfarlane   2017 9:00 AM   Office Visit   MRN: [id number]    Department:  [facility]   Dept Phone:  [phone]    Description:  Female : 1960   Provider:  Bushra Acosta M.D.           Reason for Visit     Annual Exam Hot flashes, shoulder       Allergies as of 2017     No Known Allergies      You were diagnosed with     Health care maintenance   [082856]       Marfan syndrome   [451051]       Post-menopause   [837960]       Incomplete RBBB   [574007]       FH: breast cancer   [659962]       Chronic right shoulder pain   [416881]       Neck pain   [021621]       Osteoarthritis of both knees, unspecified osteoarthritis type   [5257188]       Dyslipidemia   [286561]       Back spasm   [833985]         Vital Signs     Blood Pressure Pulse Temperature Height Weight Body Mass Index    100/82 mmHg 57 36.8 °C (98.3 °F) 1.803 m (5' 11\") 72.122 kg (159 lb) 22.19 kg/m2    Oxygen Saturation Smoking Status                99% Never Smoker           Basic Information     Date Of Birth Sex Race Ethnicity Preferred Language    1960 Female White Non- English      Problem List              ICD-10-CM Priority Class Noted - Resolved    Marfan syndrome (Chronic) Q87.40   2010 - Present    Incomplete RBBB I45.10   10/30/2012 - Present    FH: breast cancer Z80.3   2017 - Present    Pulmonary nodule R91.1   2017 - Present    Neck pain M54.2   2017 - Present    Osteoarthritis M19.90   2017 - Present    Dyslipidemia E78.5   2017 - Present    Health care maintenance Z00.00   2017 - Present    Post-menopause Z78.0   2017 - Present    Right shoulder pain M25.511   2017 - Present    Back spasm M62.830   2017 - Present      Health Maintenance        Date Due Completion Dates    IMM DTaP/Tdap/Td Vaccine (1 - Tdap) 1980 ---    MAMMOGRAM 2017, 2014, 10/30/2012, 2010    PAP SMEAR 2019   " COLONOSCOPY 11/21/2022 11/21/2012            Current Immunizations     Influenza Vaccine Quad Inj (Pf) 9/19/2015 11:05 AM, 9/13/2014      Below and/or attached are the medications your provider expects you to take. Review all of your home medications and newly ordered medications with your provider and/or pharmacist. Follow medication instructions as directed by your provider and/or pharmacist. Please keep your medication list with you and share with your provider. Update the information when medications are discontinued, doses are changed, or new medications (including over-the-counter products) are added; and carry medication information at all times in the event of emergency situations     Allergies:  No Known Allergies          Medications  Valid as of: May 31, 2017 -  9:34 AM    Generic Name Brand Name Tablet Size Instructions for use    Acetaminophen (Tab) TYLENOL 325 MG Take 650 mg by mouth Once. Prior to Iron Dextran infusion        Albuterol Sulfate (Aero Soln) albuterol 108 (90 BASE) MCG/ACT Inhale 2 Puffs by mouth every 6 hours as needed for Shortness of Breath.        Ibuprofen (Tab) MOTRIN 200 MG 1-3 PO Q6H PRN pain        .                 Medicines prescribed today were sent to:     [facility]/PHARMACY #[id number] - [place] - [address] [place] [address]    Phone: [phone] Fax: [phone]    Open 24 Hours?: No      Medication refill instructions:       If your prescription bottle indicates you have medication refills left, it is not necessary to call your provider’s office. Please contact your pharmacy and they will refill your medication.    If your prescription bottle indicates you do not have any refills left, you may request refills at any time through one of the following ways: The online [employer] system (except Urgent Care), by calling your provider’s office, or by asking your pharmacy to contact your provider’s office with a refill request. Medication refills are processed only during  regular business hours and may not be available until the next business day. Your provider may request additional information or to have a follow-up visit with you prior to refilling your medication.   *Please Note: Medication refills are assigned a new Rx number when refilled electronically. Your pharmacy may indicate that no refills were authorized even though a new prescription for the same medication is available at the pharmacy. Please request the medicine by name with the pharmacy before contacting your provider for a refill.        Your To Do List     Future Labs/Procedures Complete By Expires    CBC WITH DIFFERENTIAL  As directed 6/1/2018    COMP METABOLIC PANEL  As directed 6/1/2018    LIPID PROFILE  As directed 6/1/2018      Referral     A referral request has been sent to our patient care coordination department. Please allow 3-5 business days for us to process this request and contact you either by phone or mail. If you do not hear from us by the 5th business day, please call us at [phone].        Instructions    Call if desire PT for shoulder.           [employer] Access Code: Activation code not generated  Current [employer] Status: Active

## 2017-06-28 ENCOUNTER — HOSPITAL ENCOUNTER (OUTPATIENT)
Dept: LAB | Facility: MEDICAL CENTER | Age: 57
End: 2017-06-28
Attending: INTERNAL MEDICINE
Payer: COMMERCIAL

## 2017-06-28 DIAGNOSIS — E78.5 DYSLIPIDEMIA: ICD-10-CM

## 2017-06-28 DIAGNOSIS — M17.0 OSTEOARTHRITIS OF BOTH KNEES, UNSPECIFIED OSTEOARTHRITIS TYPE: ICD-10-CM

## 2017-06-28 LAB
ALBUMIN SERPL BCP-MCNC: 4.2 G/DL (ref 3.2–4.9)
ALBUMIN/GLOB SERPL: 1.7 G/DL
ALP SERPL-CCNC: 52 U/L (ref 30–99)
ALT SERPL-CCNC: 11 U/L (ref 2–50)
ANION GAP SERPL CALC-SCNC: 8 MMOL/L (ref 0–11.9)
AST SERPL-CCNC: 15 U/L (ref 12–45)
BASOPHILS # BLD AUTO: 1.6 % (ref 0–1.8)
BASOPHILS # BLD: 0.07 K/UL (ref 0–0.12)
BILIRUB SERPL-MCNC: 0.7 MG/DL (ref 0.1–1.5)
BUN SERPL-MCNC: 16 MG/DL (ref 8–22)
CALCIUM SERPL-MCNC: 9.5 MG/DL (ref 8.5–10.5)
CHLORIDE SERPL-SCNC: 104 MMOL/L (ref 96–112)
CHOLEST SERPL-MCNC: 229 MG/DL (ref 100–199)
CO2 SERPL-SCNC: 27 MMOL/L (ref 20–33)
CREAT SERPL-MCNC: 0.81 MG/DL (ref 0.5–1.4)
EOSINOPHIL # BLD AUTO: 0.4 K/UL (ref 0–0.51)
EOSINOPHIL NFR BLD: 9.2 % (ref 0–6.9)
ERYTHROCYTE [DISTWIDTH] IN BLOOD BY AUTOMATED COUNT: 43 FL (ref 35.9–50)
GFR SERPL CREATININE-BSD FRML MDRD: >60 ML/MIN/1.73 M 2
GLOBULIN SER CALC-MCNC: 2.5 G/DL (ref 1.9–3.5)
GLUCOSE SERPL-MCNC: 84 MG/DL (ref 65–99)
HCT VFR BLD AUTO: 43.7 % (ref 37–47)
HDLC SERPL-MCNC: 75 MG/DL
HGB BLD-MCNC: 14.6 G/DL (ref 12–16)
IMM GRANULOCYTES # BLD AUTO: 0 K/UL (ref 0–0.11)
IMM GRANULOCYTES NFR BLD AUTO: 0 % (ref 0–0.9)
LDLC SERPL CALC-MCNC: 136 MG/DL
LYMPHOCYTES # BLD AUTO: 1.82 K/UL (ref 1–4.8)
LYMPHOCYTES NFR BLD: 41.9 % (ref 22–41)
MCH RBC QN AUTO: 30.9 PG (ref 27–33)
MCHC RBC AUTO-ENTMCNC: 33.4 G/DL (ref 33.6–35)
MCV RBC AUTO: 92.4 FL (ref 81.4–97.8)
MONOCYTES # BLD AUTO: 0.38 K/UL (ref 0–0.85)
MONOCYTES NFR BLD AUTO: 8.8 % (ref 0–13.4)
NEUTROPHILS # BLD AUTO: 1.67 K/UL (ref 2–7.15)
NEUTROPHILS NFR BLD: 38.5 % (ref 44–72)
NRBC # BLD AUTO: 0.02 K/UL
NRBC BLD AUTO-RTO: 0.5 /100 WBC
PLATELET # BLD AUTO: 211 K/UL (ref 164–446)
PMV BLD AUTO: 10.8 FL (ref 9–12.9)
POTASSIUM SERPL-SCNC: 3.9 MMOL/L (ref 3.6–5.5)
PROT SERPL-MCNC: 6.7 G/DL (ref 6–8.2)
RBC # BLD AUTO: 4.73 M/UL (ref 4.2–5.4)
SODIUM SERPL-SCNC: 139 MMOL/L (ref 135–145)
TRIGL SERPL-MCNC: 89 MG/DL (ref 0–149)
WBC # BLD AUTO: 4.3 K/UL (ref 4.8–10.8)

## 2017-06-28 PROCEDURE — 80061 LIPID PANEL: CPT

## 2017-06-28 PROCEDURE — 85025 COMPLETE CBC W/AUTO DIFF WBC: CPT

## 2017-06-28 PROCEDURE — 36415 COLL VENOUS BLD VENIPUNCTURE: CPT

## 2017-06-28 PROCEDURE — 80053 COMPREHEN METABOLIC PANEL: CPT

## 2017-07-03 ENCOUNTER — OFFICE VISIT (OUTPATIENT)
Dept: CARDIOLOGY | Facility: MEDICAL CENTER | Age: 57
End: 2017-07-03
Payer: COMMERCIAL

## 2017-07-03 VITALS
DIASTOLIC BLOOD PRESSURE: 72 MMHG | SYSTOLIC BLOOD PRESSURE: 118 MMHG | WEIGHT: 159 LBS | BODY MASS INDEX: 22.26 KG/M2 | HEART RATE: 52 BPM | OXYGEN SATURATION: 98 % | HEIGHT: 71 IN

## 2017-07-03 DIAGNOSIS — R00.1 SINUS BRADYCARDIA: ICD-10-CM

## 2017-07-03 DIAGNOSIS — I45.10 INCOMPLETE RBBB: ICD-10-CM

## 2017-07-03 DIAGNOSIS — Q87.40 MARFAN SYNDROME: Primary | Chronic | ICD-10-CM

## 2017-07-03 LAB — EKG IMPRESSION: NORMAL

## 2017-07-03 PROCEDURE — 99204 OFFICE O/P NEW MOD 45 MIN: CPT | Performed by: INTERNAL MEDICINE

## 2017-07-03 PROCEDURE — 93000 ELECTROCARDIOGRAM COMPLETE: CPT | Performed by: INTERNAL MEDICINE

## 2017-07-03 NOTE — MR AVS SNAPSHOT
"        Radha Mcfarlane   7/3/2017 11:00 AM   Office Visit   MRN: [id number]    Department:  [facility]   Dept Phone:  [phone]    Description:  Female : 1960   Provider:  Glenny Johns M.D.           Reason for Visit     Follow-Up           Allergies as of 7/3/2017     No Known Allergies      You were diagnosed with     Marfan syndrome   [197857]  -  Primary     Incomplete RBBB   [[id number]]       Sinus bradycardia   [[id number]]         Vital Signs     Blood Pressure Pulse Height Weight Body Mass Index Oxygen Saturation    118/72 mmHg 52 1.803 m (5' 10.98\") 72.122 kg (159 lb) 22.19 kg/m2 98%    Smoking Status                   Never Smoker            Basic Information     Date Of Birth Sex Race Ethnicity Preferred Language    1960 Female White Non- English      Your appointments     Aug 01, 2017 10:15 AM   ECHO with [facility] [place]AM 9   [place] [place] [facility])    [address] [place] [address]   [phone]              Problem List              ICD-10-CM Priority Class Noted - Resolved    Marfan syndrome (Chronic) Q87.40   2010 - Present    Incomplete RBBB I45.10   10/30/2012 - Present    FH: breast cancer Z80.3   2017 - Present    Pulmonary nodule R91.1   2017 - Present    Neck pain M54.2   2017 - Present    Osteoarthritis M19.90   2017 - Present    Dyslipidemia E78.5   2017 - Present    Health care maintenance Z00.00   2017 - Present    Post-menopause Z78.0   2017 - Present    Right shoulder pain M25.511   2017 - Present    Back spasm M62.830   2017 - Present      Health Maintenance        Date Due Completion Dates    IMM DTaP/Tdap/Td Vaccine (1 - Tdap) 1980 ---    MAMMOGRAM 2017, 2014, 10/30/2012, 2010    IMM INFLUENZA (1) 2017, 2014    PAP SMEAR 2019    COLONOSCOPY 2022            Results       Current Immunizations     Influenza Vaccine Quad " Inj (Pf) 9/19/2015 11:05 AM, 9/13/2014      Below and/or attached are the medications your provider expects you to take. Review all of your home medications and newly ordered medications with your provider and/or pharmacist. Follow medication instructions as directed by your provider and/or pharmacist. Please keep your medication list with you and share with your provider. Update the information when medications are discontinued, doses are changed, or new medications (including over-the-counter products) are added; and carry medication information at all times in the event of emergency situations     Allergies:  No Known Allergies          Medications  Valid as of: July 03, 2017 - 11:39 AM    Generic Name Brand Name Tablet Size Instructions for use    Acetaminophen (Tab) TYLENOL 325 MG Take 650 mg by mouth Once. Prior to Iron Dextran infusion        Albuterol Sulfate (Aero Soln) albuterol 108 (90 BASE) MCG/ACT Inhale 2 Puffs by mouth every 6 hours as needed for Shortness of Breath.        Ibuprofen (Tab) MOTRIN 200 MG 1-3 PO Q6H PRN pain        .                 Medicines prescribed today were sent to:     [facility] #[id number] - [place] - [address] [place] [address]    Phone: [phone] Fax: [phone]    Open 24 Hours?: No    [facility] [id number] - [place] - [address] [facility]    [address] [place] [address] [place] [address]    Phone: [phone] Fax: [phone]    Open 24 Hours?: No      Medication refill instructions:       If your prescription bottle indicates you have medication refills left, it is not necessary to call your provider’s office. Please contact your pharmacy and they will refill your medication.    If your prescription bottle indicates you do not have any refills left, you may request refills at any time through one of the following ways: The online [employer] system (except Urgent Care), by calling your provider’s office, or by asking your pharmacy to contact your provider’s office with a  refill request. Medication refills are processed only during regular business hours and may not be available until the next business day. Your provider may request additional information or to have a follow-up visit with you prior to refilling your medication.   *Please Note: Medication refills are assigned a new Rx number when refilled electronically. Your pharmacy may indicate that no refills were authorized even though a new prescription for the same medication is available at the pharmacy. Please request the medicine by name with the pharmacy before contacting your provider for a refill.        Your To Do List     Future Labs/Procedures Complete By Expires    Echocardiogram Comp w/ Cont  As directed 7/3/2018         YYoga Access Code: Activation code not generated  Current YYoga Status: Active

## 2017-07-03 NOTE — PROGRESS NOTES
Arrhythmia Clinic Note (New patient)     DOS: 7/3/2017    Referring physician: Bushra Acosta    Chief complaint/Reason for consult: F/u Marfan's syndrome    HPI:  Patient is a 58 yo F with likely Marfan's syndrome who had seen Dr. Saleh ~4 years ago here for follow-up. She has no complaints today. Denies chest pain/SOB. Had aortic imaging including echo and MRA which were normal then.     ROS (+ highlighted in red):  Constitutional: Fevers/chills/fatigue/weightloss  HEENT: Blurry vision/eye pain/sore throat/hearing loss  Respiratory: Shortness of breath/cough  Cardiovascular: Chest pain/palpitations/edema/orthopnea/syncope  GI: Nausea/vomitting/diarrhea  MSK: Arthralgias/myagias/muscle weakness  Skin: Rash/sores  Neurological: Numbness/tremors/vertigo  Endocrine: Excessive thirst/polyuria/cold intolerance/heat intolerance  Psych: Depression/anxiety    Past Medical History   Diagnosis Date   • Marfan syndrome 9/16/2010   • Asthma    • Breath shortness    • Anesthesia      difficult waking up/ n/v   • Gynecological disorder    • Pulmonary nodule 2/13/2017   • Neck pain 2/13/2017   • Osteoarthritis 2/13/2017   • Anemia 2/13/2017   • Left shoulder pain    • Constipation    • Insomnia    • Neutropenia (CMS-HCC)        Past Surgical History   Procedure Laterality Date   • Tonsillectomy     • Pr cauter turbinate mucosa,intramural     • Hysteroscopy novasure-2  4/29/2016     Procedure: HYSTEROSCOPY NOVASURE , endometrial ablation  ;  Surgeon: Hector Saba M.D.;  Location: SURGERY SAME DAY [facility];  Service:        Social History     Social History   • Marital Status:      Spouse Name: N/A   • Number of Children: N/A   • Years of Education: N/A     Occupational History   • Not on file.     Social History Main Topics   • Smoking status: Never Smoker    • Smokeless tobacco: Never Used   • Alcohol Use: 3.6 oz/week     6 Glasses of wine per week   • Drug Use: No   • Sexual Activity: Not on file  "    Other Topics Concern   • Not on file     Social History Narrative       Family History   Problem Relation Age of Onset   • Heart Attack Mother    • Lung Disease Mother    • Breast Cancer Mother 52   • Stroke Father        No Known Allergies    Current Outpatient Prescriptions   Medication Sig Dispense Refill   • albuterol 108 (90 BASE) MCG/ACT Aero Soln inhalation aerosol Inhale 2 Puffs by mouth every 6 hours as needed for Shortness of Breath. 8.5 g 1   • ibuprofen (MOTRIN) 200 MG Tab 1-3 PO Q6H PRN pain     • acetaminophen (TYLENOL) 325 MG TABS Take 650 mg by mouth Once. Prior to Iron Dextran infusion       No current facility-administered medications for this visit.       Physical Exam:  Filed Vitals:    07/03/17 1100   BP: 118/72   Pulse: 52   Height: 1.803 m (5' 10.98\")   Weight: 72.122 kg (159 lb)   SpO2: 98%     General appearance: NAD, conversant   Eyes: anicteric sclerae, moist conjunctivae; no lid-lag; PERRLA  HENT: Atraumatic; oropharynx clear with moist mucous membranes and no mucosal ulcerations; normal hard and soft palate  Neck: Trachea midline; FROM, supple, no thyromegaly or lymphadenopathy  Lungs: CTA, with normal respiratory effort and no intercostal retractions  CV: RRR, no MRGs, no JVD   Abdomen: Soft, non-tender; no masses or HSM  Extremities: No peripheral edema or extremity lymphadenopathy  Skin: Normal temperature, turgor and texture; no rash, ulcers or subcutaneous nodules  Psych: Appropriate affect, alert and oriented to person, place and time    Data:  Prior echo/stress results reviewed:  Echo with normal LV function    Prior MRA or the aorta showing no pathology    EKG interpreted by me:  Marked sinus ellis, IRBBB    Impression/Plan:  1. Marfan syndrome  Echocardiogram Comp w/ Cont   2. Incomplete RBBB  EKG    Echocardiogram Comp w/ Cont   3. Sinus bradycardia       -Will get an echo looking at aortic root and ascending aorta  -Her sinus bradycardia is asymptomatic and I do not see an " indication for PPM  -F/u in 18 mo    [place] MD

## 2017-08-01 ENCOUNTER — HOSPITAL ENCOUNTER (OUTPATIENT)
Dept: CARDIOLOGY | Facility: MEDICAL CENTER | Age: 57
End: 2017-08-01
Attending: INTERNAL MEDICINE
Payer: COMMERCIAL

## 2017-08-01 DIAGNOSIS — I45.10 INCOMPLETE RBBB: ICD-10-CM

## 2017-08-01 DIAGNOSIS — Q87.40 MARFAN SYNDROME: Chronic | ICD-10-CM

## 2017-08-01 PROCEDURE — 93306 TTE W/DOPPLER COMPLETE: CPT | Mod: 26 | Performed by: INTERNAL MEDICINE

## 2017-08-01 PROCEDURE — 93306 TTE W/DOPPLER COMPLETE: CPT

## 2017-08-02 LAB
LV EJECT FRACT  99904: 65
LV EJECT FRACT MOD 2C 99903: 55.83
LV EJECT FRACT MOD 4C 99902: 65.52
LV EJECT FRACT MOD BP 99901: 63.35

## 2017-09-15 ENCOUNTER — TELEPHONE (OUTPATIENT)
Dept: CARDIOLOGY | Facility: MEDICAL CENTER | Age: 57
End: 2017-09-15

## 2017-09-16 ENCOUNTER — HOSPITAL ENCOUNTER (OUTPATIENT)
Facility: MEDICAL CENTER | Age: 57
End: 2017-09-16
Payer: COMMERCIAL

## 2017-09-16 LAB
ALBUMIN SERPL BCP-MCNC: 4.5 G/DL (ref 3.2–4.9)
ALBUMIN/GLOB SERPL: 1.7 G/DL
ALP SERPL-CCNC: 62 U/L (ref 30–99)
ALT SERPL-CCNC: 12 U/L (ref 2–50)
ANION GAP SERPL CALC-SCNC: 8 MMOL/L (ref 0–11.9)
AST SERPL-CCNC: 18 U/L (ref 12–45)
BDY FAT % MEASURED: 24.8 %
BILIRUB SERPL-MCNC: 0.8 MG/DL (ref 0.1–1.5)
BP DIAS: 66 MMHG
BP SYS: 114 MMHG
BUN SERPL-MCNC: 14 MG/DL (ref 8–22)
CALCIUM SERPL-MCNC: 9.6 MG/DL (ref 8.5–10.5)
CHLORIDE SERPL-SCNC: 104 MMOL/L (ref 96–112)
CHOLEST SERPL-MCNC: 253 MG/DL (ref 100–199)
CO2 SERPL-SCNC: 27 MMOL/L (ref 20–33)
CREAT SERPL-MCNC: 0.79 MG/DL (ref 0.5–1.4)
DIABETES HTDIA: NO
ERYTHROCYTE [DISTWIDTH] IN BLOOD BY AUTOMATED COUNT: 40.9 FL (ref 35.9–50)
EVENT NAME HTEVT: NORMAL
GFR SERPL CREATININE-BSD FRML MDRD: >60 ML/MIN/1.73 M 2
GLOBULIN SER CALC-MCNC: 2.6 G/DL (ref 1.9–3.5)
GLUCOSE SERPL-MCNC: 91 MG/DL (ref 65–99)
HCT VFR BLD AUTO: 45.2 % (ref 37–47)
HDLC SERPL-MCNC: 90 MG/DL
HGB BLD-MCNC: 15.4 G/DL (ref 12–16)
HYPERTENSION HTHYP: NO
LDLC SERPL CALC-MCNC: 152 MG/DL
MCH RBC QN AUTO: 31.2 PG (ref 27–33)
MCHC RBC AUTO-ENTMCNC: 34.1 G/DL (ref 33.6–35)
MCV RBC AUTO: 91.5 FL (ref 81.4–97.8)
PLATELET # BLD AUTO: 223 K/UL (ref 164–446)
PMV BLD AUTO: 10.9 FL (ref 9–12.9)
POTASSIUM SERPL-SCNC: 3.9 MMOL/L (ref 3.6–5.5)
PROT SERPL-MCNC: 7.1 G/DL (ref 6–8.2)
RBC # BLD AUTO: 4.94 M/UL (ref 4.2–5.4)
SCREENING LOC CITY HTCIT: NORMAL
SCREENING LOC STATE HTSTA: NORMAL
SCREENING LOCATION HTLOC: NORMAL
SODIUM SERPL-SCNC: 139 MMOL/L (ref 135–145)
SUBSCRIBER ID HTSID: NORMAL
T4 FREE SERPL-MCNC: 0.81 NG/DL (ref 0.53–1.43)
TRIGL SERPL-MCNC: 57 MG/DL (ref 0–149)
WBC # BLD AUTO: 3.1 K/UL (ref 4.8–10.8)

## 2018-05-10 ENCOUNTER — OFFICE VISIT (OUTPATIENT)
Dept: URGENT CARE | Facility: CLINIC | Age: 58
End: 2018-05-10
Payer: COMMERCIAL

## 2018-05-10 VITALS
OXYGEN SATURATION: 97 % | HEART RATE: 80 BPM | WEIGHT: 159.83 LBS | SYSTOLIC BLOOD PRESSURE: 122 MMHG | TEMPERATURE: 98.5 F | HEIGHT: 71 IN | DIASTOLIC BLOOD PRESSURE: 80 MMHG | BODY MASS INDEX: 22.38 KG/M2 | RESPIRATION RATE: 16 BRPM

## 2018-05-10 DIAGNOSIS — J01.00 ACUTE NON-RECURRENT MAXILLARY SINUSITIS: ICD-10-CM

## 2018-05-10 PROCEDURE — 99214 OFFICE O/P EST MOD 30 MIN: CPT | Performed by: PHYSICIAN ASSISTANT

## 2018-05-10 RX ORDER — AMOXICILLIN AND CLAVULANATE POTASSIUM 875; 125 MG/1; MG/1
1 TABLET, FILM COATED ORAL 2 TIMES DAILY
Qty: 14 TAB | Refills: 0 | Status: SHIPPED | OUTPATIENT
Start: 2018-05-10 | End: 2018-05-17

## 2018-05-10 ASSESSMENT — ENCOUNTER SYMPTOMS
MUSCULOSKELETAL NEGATIVE: 1
NAUSEA: 0
FEVER: 0
ABDOMINAL PAIN: 0
SINUS PAIN: 1
COUGH: 0
SPUTUM PRODUCTION: 0
HEADACHES: 0
CHILLS: 0
SINUS PRESSURE: 1
DIARRHEA: 0
DIZZINESS: 0
VOMITING: 0
SHORTNESS OF BREATH: 0
SORE THROAT: 0
HOARSE VOICE: 0

## 2018-05-11 NOTE — PROGRESS NOTES
"Subjective:      Radha Mcfarlane is a 58 y.o. female who presents with Sinus Problem (x 1 month, nasal congestion, headaches, face pain, teeth pain, jaw pain and Lt. ear pain)            Sinus Problem   This is a new problem. The current episode started 1 to 4 weeks ago (1 month). The problem has been gradually worsening since onset. Her pain is at a severity of 2/10. The pain is mild. Associated symptoms include congestion and sinus pressure. Pertinent negatives include no chills, coughing, ear pain, headaches, hoarse voice, shortness of breath, sneezing or sore throat. Past treatments include nothing.       Review of Systems   Constitutional: Negative for chills and fever.   HENT: Positive for congestion, sinus pain and sinus pressure. Negative for ear pain, hoarse voice, sneezing and sore throat.    Respiratory: Negative for cough, sputum production and shortness of breath.    Cardiovascular: Negative for chest pain.   Gastrointestinal: Negative for abdominal pain, diarrhea, nausea and vomiting.   Genitourinary: Negative.    Musculoskeletal: Negative.    Skin: Negative for rash.   Neurological: Negative for dizziness and headaches.          Objective:     /80   Pulse 80   Temp 36.9 °C (98.5 °F)   Resp 16   Ht 1.803 m (5' 10.98\")   Wt 72.5 kg (159 lb 13.3 oz)   SpO2 97%   BMI 22.30 kg/m²      Physical Exam   Constitutional: She is oriented to person, place, and time. She appears well-developed and well-nourished. No distress.   HENT:   Head: Normocephalic and atraumatic.   Right Ear: Hearing, tympanic membrane, external ear and ear canal normal.   Left Ear: Hearing, tympanic membrane, external ear and ear canal normal.   Nose: Mucosal edema present. Right sinus exhibits no maxillary sinus tenderness and no frontal sinus tenderness. Left sinus exhibits maxillary sinus tenderness and frontal sinus tenderness.   Mouth/Throat: Oropharynx is clear and moist. No oropharyngeal exudate, posterior " oropharyngeal edema or posterior oropharyngeal erythema.   +PND   Eyes: Conjunctivae are normal. Pupils are equal, round, and reactive to light. Right eye exhibits no discharge. Left eye exhibits no discharge.   Neck: Normal range of motion.   Cardiovascular: Normal rate, regular rhythm and normal heart sounds.    No murmur heard.  Pulmonary/Chest: Effort normal and breath sounds normal. No respiratory distress. She has no wheezes. She has no rales.   Musculoskeletal: Normal range of motion.   Lymphadenopathy:     She has no cervical adenopathy.   Neurological: She is alert and oriented to person, place, and time.   Skin: Skin is warm and dry. She is not diaphoretic.   Psychiatric: She has a normal mood and affect. Her behavior is normal.   Nursing note and vitals reviewed.         PMH:  has a past medical history of Anemia (2/13/2017); Anesthesia; Asthma; Breath shortness; Constipation; Gynecological disorder; Insomnia; Left shoulder pain; Marfan syndrome (9/16/2010); Neck pain (2/13/2017); Neutropenia (HCC); Osteoarthritis (2/13/2017); and Pulmonary nodule (2/13/2017). She also has no past medical history of Breast cancer (formerly [facility]).  MEDS:   Current Outpatient Prescriptions:   •  amoxicillin-clavulanate (AUGMENTIN) 875-125 MG Tab, Take 1 Tab by mouth 2 times a day for 7 days., Disp: 14 Tab, Rfl: 0  •  albuterol 108 (90 BASE) MCG/ACT Aero Soln inhalation aerosol, Inhale 2 Puffs by mouth every 6 hours as needed for Shortness of Breath., Disp: 8.5 g, Rfl: 1  •  ibuprofen (MOTRIN) 200 MG Tab, 1-3 PO Q6H PRN pain, Disp: , Rfl:   •  acetaminophen (TYLENOL) 325 MG TABS, Take 650 mg by mouth Once. Prior to Iron Dextran infusion, Disp: , Rfl:   ALLERGIES: No Known Allergies  SURGHX:   Past Surgical History:   Procedure Laterality Date   • HYSTEROSCOPY NOVASURE-2  4/29/2016    Procedure: HYSTEROSCOPY NOVASURE , endometrial ablation  ;  Surgeon: Hector Saba M.D.;  Location: SURGERY SAME DAY [facility];  Service:    • PB  CAUTER TURBINATE MUCOSA,INTRAMURAL     • TONSILLECTOMY       SOCHX:  reports that she has never smoked. She has never used smokeless tobacco. She reports that she drinks about 3.6 oz of alcohol per week . She reports that she does not use drugs.  FH: family history includes Breast Cancer (age of onset: 52) in her mother; Heart Attack in her mother; Lung Disease in her mother; Stroke in her father.       Assessment/Plan:     1. Acute non-recurrent maxillary sinusitis  - amoxicillin-clavulanate (AUGMENTIN) 875-125 MG Tab; Take 1 Tab by mouth 2 times a day for 7 days.  Dispense: 14 Tab; Refill: 0   - Complete full course of antibiotics as prescribed     Discussed use of nedi-pot, humidifier, and Flonase nasal spray for symptomatic relief. Call or return to office if symptoms persist or worsen. The patient demonstrated a good understanding and agreed with the treatment plan.

## 2018-05-15 ENCOUNTER — OFFICE VISIT (OUTPATIENT)
Dept: INTERNAL MEDICINE | Facility: MEDICAL CENTER | Age: 58
End: 2018-05-15
Payer: COMMERCIAL

## 2018-05-15 VITALS
SYSTOLIC BLOOD PRESSURE: 121 MMHG | WEIGHT: 162 LBS | DIASTOLIC BLOOD PRESSURE: 76 MMHG | BODY MASS INDEX: 22.68 KG/M2 | TEMPERATURE: 98.1 F | OXYGEN SATURATION: 94 % | HEART RATE: 65 BPM | HEIGHT: 71 IN

## 2018-05-15 DIAGNOSIS — G89.29 CHRONIC RIGHT SHOULDER PAIN: ICD-10-CM

## 2018-05-15 DIAGNOSIS — E78.5 DYSLIPIDEMIA: ICD-10-CM

## 2018-05-15 DIAGNOSIS — Z12.39 SCREENING FOR BREAST CANCER: ICD-10-CM

## 2018-05-15 DIAGNOSIS — M25.511 CHRONIC RIGHT SHOULDER PAIN: ICD-10-CM

## 2018-05-15 DIAGNOSIS — Z78.0 POST-MENOPAUSE: ICD-10-CM

## 2018-05-15 DIAGNOSIS — Z00.00 HEALTH CARE MAINTENANCE: ICD-10-CM

## 2018-05-15 DIAGNOSIS — Q87.40 MARFAN SYNDROME: Chronic | ICD-10-CM

## 2018-05-15 PROCEDURE — 99214 OFFICE O/P EST MOD 30 MIN: CPT | Performed by: INTERNAL MEDICINE

## 2018-05-15 ASSESSMENT — PATIENT HEALTH QUESTIONNAIRE - PHQ9: CLINICAL INTERPRETATION OF PHQ2 SCORE: 0

## 2018-05-15 NOTE — PROGRESS NOTES
Radha Mcfarlane is a 58 y.o. female who is here to re-establish care and is a former patient of Dr. Acosta.     CC: Re-establish care, Recovering from upper respiratory tract infection    HPI:    Patient is a 58-year-old female who is presenting today to reestablish care. She has two more doses left of the antibiotics. Patient was recently seen in urgent care on 10 May this year for sinus problem. She was also complaining of nasal congestion, headaches, face pain, teeth pain, and jaw pain. She received antibiotics during that visit to the urgent care. Patient was given Augmentin to complete a course of 7 days and she says she has about 2 days left. She says she is doing better but she still having a little bit of soreness in the general area. She says the nasal discharge is also a lot better.    Patient says she is also experiencing a lot more anxiety and stress in her life because of the change in her jobs. 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 Scissors a lot more stress with her new job because it can be as longer. Earlier she used to commute to work for about 2 minutes and then the gym was about a couple of minutes far away as well so she was able to get in her workout routines. She says now the commute is more than 20 minutes away in the gym is a lot further so she has not been exercising either which is wearing her down a bit.    Patient says she was also told by one of the physicians in the past that she has some physical features of Marfan syndrome. She said all her siblings have been very tall as well and says her brother has some heart abnormality. She has never been formally tested for it and never been to a  for this either. Patient was referred to a cardiologist and had an echocardiogram done last year. The echocardiogram was normal. She is not interested in getting tested for having that diagnosis  confirmed at this point as she thinks it does not make a difference. She does not have any complaints of chest pain, chest pressure, lightheadedness, dizziness, headaches. She also does not have any complaints of nausea, vomiting, abdominal pain, or any changes in her bowel movement.    Patient needs a referral to get a mammogram done, she had a colonoscopy at the age of 51 and was told to have a repeat in 10 years. In regards to the Pap smear, patient says she was anemic at some point and had a uterine ablation for that. Patient has lab work done through [facility] through her work. The last one was done in September 2017. We did go for her cholesterol as well as her 10 year cardiac risk is 1.79%.      No problem-specific Assessment & Plan notes found for this encounter.      She  has a past medical history of Anemia (2/13/2017); Anesthesia; Asthma; Breath shortness; Constipation; Gynecological disorder; Insomnia; Left shoulder pain; Marfan syndrome (9/16/2010); Neck pain (2/13/2017); Neutropenia (HCC); Osteoarthritis (2/13/2017); and Pulmonary nodule (2/13/2017). She also has no past medical history of Breast cancer (HCC).    Patient Active Problem List    Diagnosis Date Noted   • Health care maintenance 05/31/2017   • Post-menopause 05/31/2017   • Right shoulder pain 05/31/2017   • Back spasm 05/31/2017   • Dyslipidemia 05/30/2017   • FH: breast cancer 02/13/2017   • Pulmonary nodule 02/13/2017   • Neck pain 02/13/2017   • Osteoarthritis 02/13/2017   • Incomplete RBBB 10/30/2012   • Marfan syndrome 09/16/2010       Allergies:Patient has no known allergies.    Current Outpatient Prescriptions   Medication Sig Dispense Refill   • amoxicillin-clavulanate (AUGMENTIN) 875-125 MG Tab Take 1 Tab by mouth 2 times a day for 7 days. 14 Tab 0   • albuterol 108 (90 BASE) MCG/ACT Aero Soln inhalation aerosol Inhale 2 Puffs by mouth every 6 hours as needed for Shortness of Breath. 8.5 g 1   • ibuprofen (MOTRIN) 200 MG Tab  "1-3 PO Q6H PRN pain     • acetaminophen (TYLENOL) 325 MG TABS Take 650 mg by mouth Once. Prior to Iron Dextran infusion       No current facility-administered medications for this visit.        Social History   Substance Use Topics   • Smoking status: Never Smoker   • Smokeless tobacco: Never Used   • Alcohol use 3.6 oz/week     6 Glasses of wine per week       Family History   Problem Relation Age of Onset   • Heart Attack Mother    • Lung Disease Mother    • Breast Cancer Mother 52   • Stroke Father      Review of Systems:   Pertinent positives as stated in HPI, all others reviewed as negative.    Physical Exam:  Blood pressure 121/76, pulse 65, temperature 36.7 °C (98.1 °F), height 1.803 m (5' 10.98\"), weight 73.5 kg (162 lb), SpO2 94 %. Body mass index is 22.61 kg/m².    General Appearance: healthy, tall, alert, no distress, cooperative  Skin: No rashes or lesions.  Head: Normocephalic. No masses appreciated.   Eyes: PERRLA.  Ears: External ears normal.  Nose/Sinuses: Nares normal. Mucosa normal.   Oropharynx: Oropharynx moist and without lesion.  Neck: Neck supple. No adenopathy.   Lungs: Lungs clear to auscultation bilaterally.  Heart: RRR without murmur, gallop, or rubs.  Abdomen: Soft, non-tender. BS normal.   Musculoskeletal: Extremities: Upper and lower extremities appear normal. No deformities, edema.   Peripheral Pulses: Pulses: radial=4/4, dorsalis pedis=4/4  Neurologic: Cranial nerves intact.   Psychiatric: Mood appears normal.     Assessment/Plan:     1. Health care maintenance  Patient is up-to-date on colonoscopy and Pap smear.  Lab work done back in September 2017 and is too afraid in September this year.  Will order for patient to get a mammogram.  We also discussed diet and exercise and the need to be active again.  - MA-SCREEN MAMMO W/CAD-BILAT; Future    2. Dyslipidemia  According to her last cholesterol level, her 10 year cardiac risk is 1.79%.  No symptoms or concerns at this time.    3. " Chronic right shoulder pain  Patient is getting physical therapy for this and believes it is improving.    4. Post-menopause  She says she has been postmenopausal for about a year now ever since her uterine ablation.  She is doing well and does not endorse any complaints or symptoms.    5. Marfan syndrome  Presumable diagnosis of Marfan based on her physical appearance.  Has seen a cardiologist and had echocardiogram done which was normal.  Is not interested in seeing a  at this point but may become interested in some point and we'll make a referral if that becomes case.    6. Screening for breast cancer  - MA-SCREEN MAMMO W/CAD-BILAT; Future      Followup: Return in about 1 year (around 5/15/2019), or if symptoms worsen or fail to improve.    This note was created using voice recognition software. There may be unintended errors spelling, and grammar or content.

## 2018-09-08 ENCOUNTER — HOSPITAL ENCOUNTER (OUTPATIENT)
Facility: MEDICAL CENTER | Age: 58
End: 2018-09-08
Payer: COMMERCIAL

## 2018-09-08 LAB
BDY FAT % MEASURED: 30.3 %
BP DIAS: 78 MMHG
BP SYS: 118 MMHG
DIABETES HTDIA: NO
EVENT NAME HTEVT: NORMAL
HYPERTENSION HTHYP: NO
SCREENING LOC CITY HTCIT: NORMAL
SCREENING LOC STATE HTSTA: NORMAL
SCREENING LOCATION HTLOC: NORMAL
SUBSCRIBER ID HTSID: NORMAL

## 2018-09-09 LAB
CHOLEST SERPL-MCNC: 249 MG/DL (ref 100–199)
FASTING STATUS PATIENT QL REPORTED: NORMAL
GLUCOSE SERPL-MCNC: 103 MG/DL (ref 65–99)
HDLC SERPL-MCNC: 78 MG/DL
LDLC SERPL CALC-MCNC: 157 MG/DL
TRIGL SERPL-MCNC: 71 MG/DL (ref 0–149)

## 2018-10-02 ENCOUNTER — HOSPITAL ENCOUNTER (OUTPATIENT)
Dept: RADIOLOGY | Facility: MEDICAL CENTER | Age: 58
End: 2018-10-02
Attending: INTERNAL MEDICINE
Payer: COMMERCIAL

## 2018-10-02 DIAGNOSIS — Z12.39 SCREENING FOR BREAST CANCER: ICD-10-CM

## 2018-10-02 DIAGNOSIS — Z00.00 HEALTH CARE MAINTENANCE: ICD-10-CM

## 2018-10-02 PROCEDURE — 77067 SCR MAMMO BI INCL CAD: CPT

## 2018-10-04 ENCOUNTER — HOSPITAL ENCOUNTER (OUTPATIENT)
Dept: LAB | Facility: MEDICAL CENTER | Age: 58
End: 2018-10-04
Attending: OBSTETRICS & GYNECOLOGY
Payer: COMMERCIAL

## 2018-10-04 PROCEDURE — 87624 HPV HI-RISK TYP POOLED RSLT: CPT

## 2018-10-04 PROCEDURE — 88175 CYTOPATH C/V AUTO FLUID REDO: CPT

## 2018-10-09 LAB
CYTOLOGY REG CYTOL: NORMAL
HPV HR 12 DNA CVX QL NAA+PROBE: NEGATIVE
HPV16 DNA SPEC QL NAA+PROBE: NEGATIVE
HPV18 DNA SPEC QL NAA+PROBE: NEGATIVE
SPECIMEN SOURCE: NORMAL

## 2018-11-15 ENCOUNTER — APPOINTMENT (RX ONLY)
Dept: URBAN - METROPOLITAN AREA CLINIC 35 | Facility: CLINIC | Age: 58
Setting detail: DERMATOLOGY
End: 2018-11-15

## 2018-11-15 DIAGNOSIS — L82.0 INFLAMED SEBORRHEIC KERATOSIS: ICD-10-CM

## 2018-11-15 DIAGNOSIS — Z85.828 PERSONAL HISTORY OF OTHER MALIGNANT NEOPLASM OF SKIN: ICD-10-CM

## 2018-11-15 DIAGNOSIS — L81.4 OTHER MELANIN HYPERPIGMENTATION: ICD-10-CM

## 2018-11-15 DIAGNOSIS — L72.8 OTHER FOLLICULAR CYSTS OF THE SKIN AND SUBCUTANEOUS TISSUE: ICD-10-CM

## 2018-11-15 PROBLEM — J30.1 ALLERGIC RHINITIS DUE TO POLLEN: Status: ACTIVE | Noted: 2018-11-15

## 2018-11-15 PROBLEM — D23.71 OTHER BENIGN NEOPLASM OF SKIN OF RIGHT LOWER LIMB, INCLUDING HIP: Status: ACTIVE | Noted: 2018-11-15

## 2018-11-15 PROBLEM — D23.72 OTHER BENIGN NEOPLASM OF SKIN OF LEFT LOWER LIMB, INCLUDING HIP: Status: ACTIVE | Noted: 2018-11-15

## 2018-11-15 PROCEDURE — ? COUNSELING

## 2018-11-15 PROCEDURE — ? MONITORING

## 2018-11-15 PROCEDURE — 99213 OFFICE O/P EST LOW 20 MIN: CPT

## 2018-11-15 ASSESSMENT — LOCATION DETAILED DESCRIPTION DERM
LOCATION DETAILED: RIGHT MEDIAL SUPERIOR CHEST
LOCATION DETAILED: RIGHT TRAGUS
LOCATION DETAILED: MID-FRONTAL SCALP
LOCATION DETAILED: EPIGASTRIC SKIN
LOCATION DETAILED: RIGHT PROXIMAL DORSAL FOREARM

## 2018-11-15 ASSESSMENT — LOCATION SIMPLE DESCRIPTION DERM
LOCATION SIMPLE: CHEST
LOCATION SIMPLE: ABDOMEN
LOCATION SIMPLE: RIGHT EAR
LOCATION SIMPLE: RIGHT FOREARM
LOCATION SIMPLE: ANTERIOR SCALP

## 2018-11-15 ASSESSMENT — LOCATION ZONE DERM
LOCATION ZONE: TRUNK
LOCATION ZONE: EAR
LOCATION ZONE: SCALP
LOCATION ZONE: ARM

## 2019-02-19 ENCOUNTER — OFFICE VISIT (OUTPATIENT)
Dept: URGENT CARE | Facility: CLINIC | Age: 59
End: 2019-02-19
Payer: COMMERCIAL

## 2019-02-19 VITALS
TEMPERATURE: 98.6 F | RESPIRATION RATE: 16 BRPM | BODY MASS INDEX: 22.68 KG/M2 | DIASTOLIC BLOOD PRESSURE: 76 MMHG | HEIGHT: 71 IN | HEART RATE: 68 BPM | OXYGEN SATURATION: 96 % | WEIGHT: 162 LBS | SYSTOLIC BLOOD PRESSURE: 110 MMHG

## 2019-02-19 DIAGNOSIS — J06.9 UPPER RESPIRATORY TRACT INFECTION, UNSPECIFIED TYPE: ICD-10-CM

## 2019-02-19 DIAGNOSIS — R09.82 PND (POST-NASAL DRIP): ICD-10-CM

## 2019-02-19 DIAGNOSIS — J02.9 PHARYNGITIS, UNSPECIFIED ETIOLOGY: ICD-10-CM

## 2019-02-19 LAB
INT CON NEG: NEGATIVE
INT CON POS: POSITIVE
S PYO AG THROAT QL: NEGATIVE

## 2019-02-19 PROCEDURE — 87880 STREP A ASSAY W/OPTIC: CPT | Performed by: PHYSICIAN ASSISTANT

## 2019-02-19 PROCEDURE — 99214 OFFICE O/P EST MOD 30 MIN: CPT | Performed by: PHYSICIAN ASSISTANT

## 2019-02-19 RX ORDER — AMOXICILLIN 875 MG/1
875 TABLET, COATED ORAL 2 TIMES DAILY
Qty: 20 TAB | Refills: 0 | Status: SHIPPED | OUTPATIENT
Start: 2019-02-19 | End: 2019-03-27

## 2019-02-19 ASSESSMENT — ENCOUNTER SYMPTOMS
SORE THROAT: 1
DIARRHEA: 0
WHEEZING: 0
SHORTNESS OF BREATH: 0
VOMITING: 0
CHILLS: 0
SPUTUM PRODUCTION: 0
SINUS PAIN: 0
NAUSEA: 0
FEVER: 0
COUGH: 0
ABDOMINAL PAIN: 0

## 2019-02-19 NOTE — PROGRESS NOTES
"Subjective:   Radha Mcfarlane is a 59 y.o. female who presents for Pharyngitis (x 1 day)        Pharyngitis    This is a new problem. The current episode started today. Associated symptoms include congestion. Pertinent negatives include no abdominal pain, coughing, diarrhea, ear pain, shortness of breath or vomiting.     Notes last night w/ ST, notes awoke from sleep, denies cough, denies fever/chills, denies much sinus or chest congestion, c/o PND, denies nausea/vomitinga/bdpain/diarrhea/rash, PMH of strep years ago, is  w/ strep in class last week. PMH of asthma, denies wheeze now, denies pMH of bronchitis/pneumonia, does have seasonal allerg. Takes antihistamine. Tried nyquil. Would like back up abx Rx.     Review of Systems   Constitutional: Negative for chills and fever.   HENT: Positive for congestion and sore throat. Negative for ear pain and sinus pain.    Respiratory: Negative for cough, sputum production, shortness of breath and wheezing.    Gastrointestinal: Negative for abdominal pain, diarrhea, nausea and vomiting.   Skin: Negative for rash.   Endo/Heme/Allergies: Positive for environmental allergies.     No Known Allergies   I have worn a mask for the entire encounter with this patient.    Objective:   /76 (BP Location: Left arm, Patient Position: Sitting, BP Cuff Size: Adult)   Pulse 68   Temp 37 °C (98.6 °F) (Temporal)   Resp 16   Ht 1.803 m (5' 11\")   Wt 73.5 kg (162 lb)   SpO2 96%   BMI 22.59 kg/m²   Physical Exam   Constitutional: She is oriented to person, place, and time. She appears well-developed and well-nourished. No distress.   HENT:   Head: Normocephalic and atraumatic.   Right Ear: Tympanic membrane, external ear and ear canal normal.   Left Ear: Tympanic membrane, external ear and ear canal normal.   Nose: Nose normal.   Mouth/Throat: Uvula is midline and mucous membranes are normal. Posterior oropharyngeal erythema ( mild PND) present. No oropharyngeal " exudate, posterior oropharyngeal edema or tonsillar abscesses.   Eyes: Conjunctivae and lids are normal. Right eye exhibits no discharge. Left eye exhibits no discharge. No scleral icterus.   Neck: Neck supple.   Pulmonary/Chest: Effort normal. No respiratory distress. She has no decreased breath sounds. She has no wheezes. She has no rhonchi. She has no rales.   Musculoskeletal: Normal range of motion.   Lymphadenopathy:     She has cervical adenopathy ( mild bilat).   Neurological: She is alert and oriented to person, place, and time. She is not disoriented.   Skin: Skin is warm and dry. She is not diaphoretic. No erythema. No pallor.   Psychiatric: Her speech is normal and behavior is normal.   Nursing note and vitals reviewed.  POCT strep - NEG      Assessment/Plan:   1. Pharyngitis, unspecified etiology  - POCT Rapid Strep A  - amoxicillin (AMOXIL) 875 MG tablet; Take 1 Tab by mouth 2 times a day.  Dispense: 20 Tab; Refill: 0    2. PND (post-nasal drip)    3. Upper respiratory tract infection, unspecified type  Supportive care is reviewed with patient/caregiver - recommend to push PO fluids and electrolytes, Nsaids/tylenol, netti pot/saline irrig, humidifier in home, flonase, ponaris, antihistamine, Contingent antibiotic prescription given to patient to fill upon meeting criteria of guidelines discussed.   If filling,  take full course of Rx, take with probiotics, observe for resolution  Return to clinic with lack of resolution or progression of symptoms.      Differential diagnosis, natural history, supportive care, and indications for immediate follow-up discussed.

## 2019-03-27 ENCOUNTER — OFFICE VISIT (OUTPATIENT)
Dept: INTERNAL MEDICINE | Facility: MEDICAL CENTER | Age: 59
End: 2019-03-27
Payer: COMMERCIAL

## 2019-03-27 VITALS
TEMPERATURE: 99.1 F | DIASTOLIC BLOOD PRESSURE: 73 MMHG | OXYGEN SATURATION: 97 % | HEIGHT: 71 IN | WEIGHT: 165 LBS | SYSTOLIC BLOOD PRESSURE: 119 MMHG | BODY MASS INDEX: 23.1 KG/M2 | HEART RATE: 64 BPM

## 2019-03-27 DIAGNOSIS — M47.812 OSTEOARTHRITIS OF CERVICAL SPINE, UNSPECIFIED SPINAL OSTEOARTHRITIS COMPLICATION STATUS: ICD-10-CM

## 2019-03-27 DIAGNOSIS — M54.2 NECK PAIN: ICD-10-CM

## 2019-03-27 PROCEDURE — 99213 OFFICE O/P EST LOW 20 MIN: CPT | Performed by: INTERNAL MEDICINE

## 2019-03-27 RX ORDER — CYCLOBENZAPRINE HCL 5 MG
5-10 TABLET ORAL 3 TIMES DAILY PRN
Qty: 30 TAB | Refills: 0 | Status: SHIPPED | OUTPATIENT
Start: 2019-03-27 | End: 2019-05-03 | Stop reason: SDUPTHER

## 2019-03-27 RX ORDER — LORATADINE 10 MG/1
10 TABLET ORAL DAILY
COMMUNITY

## 2019-03-27 ASSESSMENT — PATIENT HEALTH QUESTIONNAIRE - PHQ9: CLINICAL INTERPRETATION OF PHQ2 SCORE: 0

## 2019-03-27 NOTE — PROGRESS NOTES
Radha Mcfarlane is a 59 y.o. female who is here for an acute visit for neck pain.    CC: Neck pain    HPI:    Patient is a 59-year-old female who is here for an acute visit today with complaints of neck pain.  Patient said this started at 6 months ago when she had problems with her left foot.  She was told that there was a tendon which was breaking some of the bone away.  She is in a boot for about 2 weeks and she said her whole body fell out of alignment and that is when she started noticing the neck pain.  She said she was diagnosed with osteoarthritis of the neck many years ago but she did physical therapy at that point and it was managed.  Now it has flared up again.  She has been doing exercises for the neck, has been doing yoga and it does help but has not gotten to the point where she feels comfortable.  She says at night, it is worse.  She has tried sleeping on her back, wearing a brace for her neck and using a cervical pillow which is not helping.  She was first diagnosed with osteoarthritis 5 to 6 years back and she said that she also did see an orthopedic at that time who injected some solution on one side of the neck which helped with the pain.  She said she also recently did go hiking and might have overdone it which she thinks contributed to it.  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  She thinks that probably helped with pain control in the past but now that she is doing it, it is back.  She said the pain does radiate to her shoulders and down her back as well.  She does take Aleve from time to time and will take Tylenol if needed as well.  Her pain is rated mostly at a 7 out of 10 intensity, does not complain of any muscle spasms at this time.  She does not complain of any weakness, no trauma to the area, there is some limited range of motion because of the pain.    No  problem-specific Assessment & Plan notes found for this encounter.      She  has a past medical history of Anemia (2/13/2017); Anesthesia; Asthma; Breath shortness; Constipation; Gynecological disorder; Insomnia; Left shoulder pain; Marfan syndrome (9/16/2010); Neck pain (2/13/2017); Neutropenia (HCC); Osteoarthritis (2/13/2017); and Pulmonary nodule (2/13/2017). She also has no past medical history of Breast cancer (HCC).    Patient Active Problem List    Diagnosis Date Noted   • Health care maintenance 05/31/2017   • Post-menopause 05/31/2017   • Right shoulder pain 05/31/2017   • Back spasm 05/31/2017   • Dyslipidemia 05/30/2017   • FH: breast cancer 02/13/2017   • Pulmonary nodule 02/13/2017   • Neck pain 02/13/2017   • Osteoarthritis 02/13/2017   • Incomplete RBBB 10/30/2012   • Marfan syndrome 09/16/2010       Allergies:Patient has no known allergies.    Current Outpatient Prescriptions   Medication Sig Dispense Refill   • loratadine (CLARITIN) 10 MG Tab Take 10 mg by mouth every day.     • cyclobenzaprine (FLEXERIL) 5 MG tablet Take 1-2 Tabs by mouth 3 times a day as needed. 30 Tab 0   • Diclofenac Sodium 1 % Gel Apply 1 g to skin as directed 3 times a day as needed. 1 Tube 3   • ibuprofen (MOTRIN) 200 MG Tab 1-3 PO Q6H PRN pain     • acetaminophen (TYLENOL) 325 MG TABS Take 650 mg by mouth Once. Prior to Iron Dextran infusion       No current facility-administered medications for this visit.        Social History   Substance Use Topics   • Smoking status: Never Smoker   • Smokeless tobacco: Never Used   • Alcohol use 3.6 oz/week     6 Glasses of wine per week       Family History   Problem Relation Age of Onset   • Heart Attack Mother    • Lung Disease Mother    • Breast Cancer Mother 52   • Stroke Father        Review of Systems:   Pertinent positives as stated in HPI, all others reviewed as negative.    Physical Exam:  Blood pressure 119/73, pulse 64, temperature 37.3 °C (99.1 °F), temperature source  "Temporal, height 1.803 m (5' 11\"), weight 74.8 kg (165 lb), SpO2 97 %. Body mass index is 23.01 kg/m².    General Appearance: healthy, alert, no distress, cooperative  Neck: Neck supple.   Lungs: Lungs clear to auscultation bilaterally.  Heart: RRR without murmur, gallop, or rubs.  Musculoskeletal: Extremities: Upper and lower extremities appear normal. No deformities, edema.  There is limited range of motion of the neck, she does have some tenderness to palpation over certain muscle groups in her upper back.  Range of motion of the shoulders is intact bilaterally.  No deformities noted.  Psychiatric: Mood appears normal.     Assessment/Plan:     1. Neck pain  She does not have any sensational changes, no numbness, no tingling, no weakness.  This is likely her osteoarthritis flaring up due to certain things which have happened and she has been more active recently and is not exercising as she used to.  We will make a referral for physical therapy.  Patient advised to take cyclobenzaprine when she has muscle spasms and for relaxation because there is some tension in the muscle groups around that area.  Also recommended to try topical solution such as Voltaren gel and apply it as needed for pain relief.  Recommended it is okay to take an Aleve or Tylenol and she can alternate those for pain relief for now.  Also recommended that if physical therapy does not improve the pain, will need imaging and possibly for her to see an orthopedic.  Patient was also given a booklet from physical therapy for neck and back pain which has exercises that she can do at home.  - REFERRAL TO PHYSICAL THERAPY Reason for Therapy: Eval/Treat/Report  - cyclobenzaprine (FLEXERIL) 5 MG tablet; Take 1-2 Tabs by mouth 3 times a day as needed.  Dispense: 30 Tab; Refill: 0  - Diclofenac Sodium 1 % Gel; Apply 1 g to skin as directed 3 times a day as needed.  Dispense: 1 Tube; Refill: 3    2. Osteoarthritis of cervical spine, unspecified spinal " osteoarthritis complication status  See above.   - REFERRAL TO PHYSICAL THERAPY Reason for Therapy: Eval/Treat/Report  - cyclobenzaprine (FLEXERIL) 5 MG tablet; Take 1-2 Tabs by mouth 3 times a day as needed.  Dispense: 30 Tab; Refill: 0  - Diclofenac Sodium 1 % Gel; Apply 1 g to skin as directed 3 times a day as needed.  Dispense: 1 Tube; Refill: 3      Followup: Return in about 3 months (around 6/27/2019), or if symptoms worsen or fail to improve, for Annual Exam.    This note was created using voice recognition software. There may be unintended errors spelling, and grammar or content.

## 2019-04-07 ENCOUNTER — PATIENT MESSAGE (OUTPATIENT)
Dept: INTERNAL MEDICINE | Facility: MEDICAL CENTER | Age: 59
End: 2019-04-07

## 2019-04-08 ENCOUNTER — PATIENT MESSAGE (OUTPATIENT)
Dept: INTERNAL MEDICINE | Facility: MEDICAL CENTER | Age: 59
End: 2019-04-08

## 2019-04-08 NOTE — TELEPHONE ENCOUNTER
From: Radha Mcfarlane  To: Ming Shaikh M.D.  Sent: 4/8/2019 7:56 AM PDT  Subject: RE: Procedure Question      Thank you so much. I did not receive any messages from them. I love right there so I will stop by on my way home from work. Thank you so much!  ----- Message -----  From: Olivier Echavarria Ass't  Sent: 4/8/19 7:15 AM  To: Radha Mcfarlane  Subject: RE: Procedure Question    Based of your chart it looks like they called on 04/05/19 to get you scheduled I have attached the info below please give [facility] a call to get scheduled.    Referral Notes 04/05/2019  4:13 PM Carissa Meneses - -  Note     1st call; lvm to sched new evaluation        Type Date User Summary Attachment  Referral Notes 04/01/2019  4:03 PM Christine Garza - -  Note     Referral information sent to the following:     [facility]  [address] [place]. [address]  [phone]            ----- Message -----   From: Radha Mcfarlane   Sent: 4/7/2019 8:34 PM PDT   To: Ming Shaikh M.D.  Subject: Procedure Question    Hello,  I have not heard about physical therapy yet. It has been almost two weeks. Should I be pursuing this our is this wait time typical. Thank you for you help.  Radha Mcfarlane

## 2019-04-08 NOTE — TELEPHONE ENCOUNTER
From: Radha Mcfarlane  To: Ming Shaikh M.D.  Sent: 4/7/2019 8:34 PM PDT  Subject: Procedure Question    Hello,  I have not heard about physical therapy yet. It has been almost two weeks. Should I be pursuing this our is this wait time typical. Thank you for you help.  Radha Mcfarlane

## 2019-04-18 ENCOUNTER — PHYSICAL THERAPY (OUTPATIENT)
Dept: PHYSICAL THERAPY | Facility: REHABILITATION | Age: 59
End: 2019-04-18
Attending: INTERNAL MEDICINE
Payer: COMMERCIAL

## 2019-04-18 DIAGNOSIS — M54.2 NECK PAIN: ICD-10-CM

## 2019-04-18 DIAGNOSIS — M47.812 OSTEOARTHRITIS OF CERVICAL SPINE, UNSPECIFIED SPINAL OSTEOARTHRITIS COMPLICATION STATUS: ICD-10-CM

## 2019-04-18 PROCEDURE — 97162 PT EVAL MOD COMPLEX 30 MIN: CPT

## 2019-04-18 PROCEDURE — 97014 ELECTRIC STIMULATION THERAPY: CPT

## 2019-04-18 PROCEDURE — 97140 MANUAL THERAPY 1/> REGIONS: CPT

## 2019-04-18 PROCEDURE — 97110 THERAPEUTIC EXERCISES: CPT

## 2019-04-18 ASSESSMENT — ENCOUNTER SYMPTOMS
PAIN SCALE AT HIGHEST: 9
PAIN SCALE AT LOWEST: 4
PAIN SCALE: 8

## 2019-04-18 NOTE — OP THERAPY EVALUATION
Outpatient Physical Therapy  INITIAL EVALUATION    [facility]  [address], [address]  [place] [address]  Phone:  [phone]    Date of Evaluation: 04/18/2019    Patient: Radha Mcfarlane  YOB: 1960  MRN: [id number]     Referring Provider: Ming Shaikh M.D.  [address]  [address] [place] [address]   Referring Diagnosis Neck pain [M54.2];Osteoarthritis of cervical spine, unspecified spinal osteoarthritis complication status [M47.812]     Time Calculation  Start time: 0200  Stop time: 0315 Time Calculation (min): 75 minutes     Physical Therapy Occurrence Codes    Date of onset of impairment:  3/27/19   Date physical therapy care plan established or reviewed:  4/18/19   Date physical therapy treatment started:  4/18/19          Chief Complaint: No chief complaint on file.    Visit Diagnoses     ICD-10-CM   1. Neck pain M54.2   2. Osteoarthritis of cervical spine, unspecified spinal osteoarthritis complication status M47.812         Subjective:   History of Present Illness:     Mechanism of injury:  L. Neck pain that has been going on for years. Condition has started to worsening around the time of starting a new job: lifestyle changed where I stopped going yoga, had a large amount of hiking past summer. 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 Every once in awhile I am having N/T down L. Arm. Occasional tension head aches. For past couple months these have been coming on daily. Denies locking of neck. Sleeping with collar on now due to interruption from neck pain. I have been able to stop being a stomach sleeper. Had massage 3/20 and that provided great relief that lasted one week. Returned to yoga for past 4 months, now doing power vinyasa. I don't feel it is helping or hurting. Started taking mm relaxer's, currently taking. Had one bad day where I was very active and stressed, had dizziness and bad head ache.        Past medical Hx: R. Shoulder bursitis within past 9 months     Headaches:  tension headaches  Sleep disturbance:  Interrupted sleep  Pain:     Current pain ratin    At best pain ratin    At worst pain ratin    Location:  Left temple, oribital, posterior L. neck and shoulder  Hand dominance:  Right      Past Medical History:   Diagnosis Date   • Anemia 2017   • Anesthesia     difficult waking up/ n/v   • Asthma    • Breath shortness    • Constipation    • Gynecological disorder    • Insomnia    • Left shoulder pain    • Marfan syndrome 2010   • Neck pain 2017   • Neutropenia (HCC)    • Osteoarthritis 2017   • Pulmonary nodule 2017     Past Surgical History:   Procedure Laterality Date   • HYSTEROSCOPY NOVASURE-2  2016    Procedure: HYSTEROSCOPY NOVASURE , endometrial ablation  ;  Surgeon: Hector Saba M.D.;  Location: SURGERY SAME DAY [facility];  Service:    • PB CAUTER TURBINATE MUCOSA,INTRAMURAL     • TONSILLECTOMY       Social History   Substance Use Topics   • Smoking status: Never Smoker   • Smokeless tobacco: Never Used   • Alcohol use 3.6 oz/week     6 Glasses of wine per week     Family and Occupational History     Social History   • Marital status:      Spouse name: N/A   • Number of children: N/A   • Years of education: N/A       Objective     Observations   Central spine     Positive for rounded shoulders.    Additional Observation Details  Long neck     Shoulder Screen    Shoulder active range of motion within functional limits.  Shoulder strength within functional limits with the following exceptions: Flexion and external rotation 4/5 and reproduces neck/shoulder pain    Active Range of Motion     Cervical Spine   Flexion: within functional limits  Extension: decreased  Retraction: within functional limits  Left lateral flexion: within functional limits  Right lateral flexion: decreased  Left rotation: decreased  Right rotation: within  functional limits    Joint Play   Spine     Central PA [place]        C4: hypermobile with grade 4       C6: hypermobile with grade 4        Strength:    Cervical Spine   Deep neck flexors: 4-  Deep neck extensors: 4-    Temporomandibular Joint (TMJ)   General observations:     Positive for clenching.        Therapeutic Exercises (CPT 60869):     1. Prone scap retraction (I's)    Therapeutic Treatments and Modalities:     1. Manual Therapy (CPT 27470), cervical , STW for tissue hydration SCM, scalenes, philippe-scapular, and cervical paraspinals     2. E Stim Unattended (CPT 64785), cervical, IFC and MHP 15 min duration     Time-based treatments/modalities:  Manual therapy minutes (CPT 63411): 20 minutes  Therapeutic exercise minutes (CPT 07121): 10 minutes       Assessment, Response and Plan:   Impairments: abnormal muscle tone, abnormal or restricted ROM and lacks appropriate home exercise program    Assessment details:  Mrs. Gonzalez is a 59 year old female referred to PT for chronic, intermittent, neck pain. 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 She is having daily tension type head aches that are provoked with excessive time spent looking down grading papers (occupation educuator) Reporting a past diagnosis of osteoarthritis in her neck. She presented with loss of cervical mobility mostly due to muscle guarding in anterior and left posterior neck musculature. Her body type is a flexible type and she did have some areas of hypermobility in her mid cervical spine. I think patient can benefit from PT to restore normal myofascial tone and educate patient in cervical stabilization training, as well as exercise and stretches for improving postural endurance in shoulders.   Prognosis: good    Goals:   Short Term Goals:   Patient will demonstrate improved sitting posture and ergonomics for work duties   Reduction in myofascial tone through PT assessment and  patient having improved cervical range of motion  Patient will be independent in home exercise program and self care strategies   Short term goal time span:  2-4 weeks      Long Term Goals:    Improved NDI score indicating a significant improvement in perceived functional disability. Significant score indicated by a 5 point or more score difference from intake.   Patient will be independent for long term home exercise program, along with any progressions or regressions from previous, and all self care strategies   5/5 shoulder strength without neck pain  Long term goal time span:  4-6 weeks    Plan:   Planned therapy interventions:  E Stim Unattended (CPT 28682), Functional Training, Self Care (CPT 87121), Hot or Cold Pack Therapy (CPT 07387), Manual Therapy (CPT 83450), Neuromuscular Re-education (CPT 74457), Self Care ADL Training (CPT 48663), Therapeutic Activities (CPT 26637) and Therapeutic Exercise (CPT 60415)  Frequency:  2x week  Duration in weeks:  6  Discussed with:  Patient      Functional Limitations and Severity Modifiers      Current:     Goal:       Referring provider co-signature:  I have reviewed this plan of care and my co-signature certifies the need for services.      Physician Signature: ________________________________ Date: ______________

## 2019-04-22 ENCOUNTER — PHYSICAL THERAPY (OUTPATIENT)
Dept: PHYSICAL THERAPY | Facility: REHABILITATION | Age: 59
End: 2019-04-22
Attending: INTERNAL MEDICINE
Payer: COMMERCIAL

## 2019-04-22 DIAGNOSIS — M54.2 NECK PAIN: ICD-10-CM

## 2019-04-22 DIAGNOSIS — M47.812 OSTEOARTHRITIS OF CERVICAL SPINE, UNSPECIFIED SPINAL OSTEOARTHRITIS COMPLICATION STATUS: ICD-10-CM

## 2019-04-22 PROCEDURE — 97014 ELECTRIC STIMULATION THERAPY: CPT

## 2019-04-22 PROCEDURE — 97110 THERAPEUTIC EXERCISES: CPT

## 2019-04-22 PROCEDURE — 97140 MANUAL THERAPY 1/> REGIONS: CPT

## 2019-04-22 NOTE — OP THERAPY DAILY TREATMENT
Outpatient Physical Therapy  DAILY TREATMENT     [facility] [address], [address]  [place] [address]  Phone:  [phone]    Date: 04/22/2019    Patient: Radha Mcfarlane  YOB: 1960  MRN: [id number]     Time Calculation  Start time: 1130  Stop time: 1215 Time Calculation (min): 45 minutes     Chief Complaint: No chief complaint on file.    Visit #: 2    SUBJECTIVE:  Reduction in neck tension after last PT session. Symptoms returned but are more localized to specific area, L. Jaw and L neck and L. Lower thoracic.     OBJECTIVE:  Current objective measures:           Therapeutic Exercises (CPT 45367):     1. Cervical extension isometrics, balloon, 10 x 3'' holds    Therapeutic Treatments and Modalities:     1. Manual Therapy (CPT 67781), cervical, STW sub-occipitals and temporalis, L. SCM and cervical paraspinals    2. E Stim Unattended (CPT 17079), cervical, IFC and MHP 15 min duration     Time-based treatments/modalities:  Manual therapy minutes (CPT 79986): 10 minutes  Therapeutic exercise minutes (CPT 33569): 20 minutes         ASSESSMENT:   Response to treatment: Progressed program with introduction to cervical stabilization with cervical extension isometrics in supine. Tolerated well. Reduced tension in neck relative to last week.     PLAN/RECOMMENDATIONS:   Plan for treatment: therapy treatment to continue next visit.  Planned interventions for next visit: continue with current treatment.

## 2019-04-25 ENCOUNTER — PHYSICAL THERAPY (OUTPATIENT)
Dept: PHYSICAL THERAPY | Facility: REHABILITATION | Age: 59
End: 2019-04-25
Attending: INTERNAL MEDICINE
Payer: COMMERCIAL

## 2019-04-25 DIAGNOSIS — M47.812 OSTEOARTHRITIS OF CERVICAL SPINE, UNSPECIFIED SPINAL OSTEOARTHRITIS COMPLICATION STATUS: ICD-10-CM

## 2019-04-25 DIAGNOSIS — M54.2 NECK PAIN: ICD-10-CM

## 2019-04-25 PROCEDURE — 97014 ELECTRIC STIMULATION THERAPY: CPT

## 2019-04-25 PROCEDURE — 97140 MANUAL THERAPY 1/> REGIONS: CPT

## 2019-04-25 NOTE — OP THERAPY DAILY TREATMENT
Outpatient Physical Therapy  DAILY TREATMENT     [facility] [address], [address]  [place] [address]  Phone:  [phone]    Date: 04/25/2019    Patient: Radha Mcfarlane  YOB: 1960  MRN: [id number]     Time Calculation  Start time: 0330  Stop time: 0415 Time Calculation (min): 45 minutes     Chief Complaint: No chief complaint on file.    Visit #: 3    SUBJECTIVE:  Returned to work this week, feeling a lot of neck tension and skull tension as well as head ache.     OBJECTIVE:  Current objective measures:   Numerous areas of hypertonicity in anterior cervical as well as shoulders.           Therapeutic Exercises (CPT 19600):     1. Cervical extension isometrics, balloon, 10 x 3'' holds    Therapeutic Treatments and Modalities:     1. Manual Therapy (CPT 18980), cervical, STW sub-occipitals and temporalis, L. SCM and cervical paraspinals    2. E Stim Unattended (CPT 81483), cervical, IFC and MHP 15 min duration     Time-based treatments/modalities:  Manual therapy minutes (CPT 60773): 30 minutes         ASSESSMENT:   Response to treatment: much of patient's tension today was originating from shoulders. Patient reported symptom reduction at end of session.     PLAN/RECOMMENDATIONS:   Plan for treatment: therapy treatment to continue next visit.  Planned interventions for next visit: continue with current treatment.

## 2019-04-30 ENCOUNTER — PHYSICAL THERAPY (OUTPATIENT)
Dept: PHYSICAL THERAPY | Facility: REHABILITATION | Age: 59
End: 2019-04-30
Attending: INTERNAL MEDICINE
Payer: COMMERCIAL

## 2019-04-30 DIAGNOSIS — M54.2 NECK PAIN: ICD-10-CM

## 2019-04-30 DIAGNOSIS — M47.812 OSTEOARTHRITIS OF CERVICAL SPINE, UNSPECIFIED SPINAL OSTEOARTHRITIS COMPLICATION STATUS: ICD-10-CM

## 2019-04-30 PROCEDURE — 97140 MANUAL THERAPY 1/> REGIONS: CPT

## 2019-04-30 PROCEDURE — 97014 ELECTRIC STIMULATION THERAPY: CPT

## 2019-04-30 PROCEDURE — 97110 THERAPEUTIC EXERCISES: CPT

## 2019-05-03 DIAGNOSIS — M54.2 NECK PAIN: ICD-10-CM

## 2019-05-03 DIAGNOSIS — M47.812 OSTEOARTHRITIS OF CERVICAL SPINE, UNSPECIFIED SPINAL OSTEOARTHRITIS COMPLICATION STATUS: ICD-10-CM

## 2019-05-06 RX ORDER — CYCLOBENZAPRINE HCL 5 MG
5-10 TABLET ORAL 3 TIMES DAILY PRN
Qty: 30 TAB | Refills: 0 | Status: SHIPPED | OUTPATIENT
Start: 2019-05-06 | End: 2022-07-21 | Stop reason: SDUPTHER

## 2019-05-06 NOTE — TELEPHONE ENCOUNTER
Last seen: 03/27/19 by Dr. Shaikh  Next appt: 06/11/19 with Dr. Shaikh    Was the patient seen in the last year in this department? Yes   Does patient have an active prescription for medications requested? No   Received Request Via: Pharmacy

## 2019-05-06 NOTE — TELEPHONE ENCOUNTER
From: Radha Mcfarlane  Sent: 5/3/2019 8:19 PM PDT  Subject: Medication Renewal Request    Radha Mcfarlane would like a refill of the following medications:     cyclobenzaprine (FLEXERIL) 5 MG tablet [Ming Shaikh M.D.]    Preferred pharmacy: [facility][id number] - [place], [place] - [address]    Comment:

## 2019-05-07 ENCOUNTER — PHYSICAL THERAPY (OUTPATIENT)
Dept: PHYSICAL THERAPY | Facility: REHABILITATION | Age: 59
End: 2019-05-07
Attending: INTERNAL MEDICINE
Payer: COMMERCIAL

## 2019-05-07 DIAGNOSIS — M54.2 NECK PAIN: ICD-10-CM

## 2019-05-07 PROCEDURE — 97140 MANUAL THERAPY 1/> REGIONS: CPT

## 2019-05-07 PROCEDURE — 97014 ELECTRIC STIMULATION THERAPY: CPT

## 2019-05-07 NOTE — OP THERAPY DAILY TREATMENT
Outpatient Physical Therapy  DAILY TREATMENT     [facility] [address], [address]  [place] [address]  Phone:  [phone]    Date: 05/07/2019    Patient: Radha Mcfarlane  YOB: 1960  MRN: [id number]     Time Calculation  Start time: 0330  Stop time: 0415 Time Calculation (min): 45 minutes     Chief Complaint: No chief complaint on file.    Visit #: 5    SUBJECTIVE:  I have pain returning my head up from looking down. Pain is left anterolateral neck. The longer I look down the more painful it is when I look back up again.     OBJECTIVE:  Current objective measures:     Hypertonicity SCM and middle scalene on L.         Therapeutic Exercises (CPT 19692):     1. OA flexion , nxt visit    2. Cervical retraction in Quadraped , nxt visit    3. Cervical retraction on FR w/ arm mvmt, nxt visit      Time-based treatments/modalities:  Manual therapy minutes (CPT 10610): 30 minutes         ASSESSMENT:   Response to treatment: Future session should work on correction forward head posture, upper cervical retraction endurance, coordination cervical flexion without protraction in standing and sitting postures. Good relief of muscle tone achieved, patient able to look down and up with tension but not pain into lateral neck.     PLAN/RECOMMENDATIONS:   Plan for treatment: therapy treatment to continue next visit.  Planned interventions for next visit: continue with current treatment.

## 2019-05-09 ENCOUNTER — PHYSICAL THERAPY (OUTPATIENT)
Dept: PHYSICAL THERAPY | Facility: REHABILITATION | Age: 59
End: 2019-05-09
Attending: INTERNAL MEDICINE
Payer: COMMERCIAL

## 2019-05-09 DIAGNOSIS — M47.812 OSTEOARTHRITIS OF CERVICAL SPINE, UNSPECIFIED SPINAL OSTEOARTHRITIS COMPLICATION STATUS: ICD-10-CM

## 2019-05-09 DIAGNOSIS — M54.2 NECK PAIN: ICD-10-CM

## 2019-05-09 PROCEDURE — 97112 NEUROMUSCULAR REEDUCATION: CPT

## 2019-05-09 PROCEDURE — 97140 MANUAL THERAPY 1/> REGIONS: CPT

## 2019-05-09 PROCEDURE — 97014 ELECTRIC STIMULATION THERAPY: CPT

## 2019-05-10 NOTE — OP THERAPY DAILY TREATMENT
Outpatient Physical Therapy  DAILY TREATMENT     [facility] [address], [address]  [place] [address]  Phone:  [phone]    Date: 05/09/2019    Patient: Radha Mcfarlane  YOB: 1960  MRN: [id number]     Time Calculation  Start time: 0330  Stop time: 0400 Time Calculation (min): 30 minutes     Chief Complaint: No chief complaint on file.    Visit #: 6    SUBJECTIVE:  My neck feels horrible today, I am having a terrible head ache and dizziness.     OBJECTIVE:  Current objective measures:             Therapeutic Exercises (CPT 78638):     1. OA flexion , 8 min with towel , feedback for not recruiting SCM    2. Cervical retraction in Quadraped , nxt visit    3. Cervical retraction on FR w/ arm mvmt, nxt visit      Time-based treatments/modalities:  Manual therapy minutes (CPT 60777): 22 minutes  Neuromusc re-ed, balance, coor, post minutes (CPT 01257): 8 minutes         ASSESSMENT:   Response to treatment: at end of treatment session a reduction in muscle tone in neck and shoulders was achieved but patient did not report a significant amount of symptom relief.     PLAN/RECOMMENDATIONS:   Plan for treatment: therapy treatment to continue next visit.  Planned interventions for next visit: continue with current treatment.

## 2019-05-16 ENCOUNTER — PHYSICAL THERAPY (OUTPATIENT)
Dept: PHYSICAL THERAPY | Facility: REHABILITATION | Age: 59
End: 2019-05-16
Attending: INTERNAL MEDICINE
Payer: COMMERCIAL

## 2019-05-16 DIAGNOSIS — M47.812 OSTEOARTHRITIS OF CERVICAL SPINE, UNSPECIFIED SPINAL OSTEOARTHRITIS COMPLICATION STATUS: ICD-10-CM

## 2019-05-16 DIAGNOSIS — M54.2 NECK PAIN: ICD-10-CM

## 2019-05-16 PROCEDURE — 97140 MANUAL THERAPY 1/> REGIONS: CPT

## 2019-05-16 PROCEDURE — 97014 ELECTRIC STIMULATION THERAPY: CPT

## 2019-05-16 PROCEDURE — 97535 SELF CARE MNGMENT TRAINING: CPT

## 2019-05-16 NOTE — OP THERAPY DAILY TREATMENT
Outpatient Physical Therapy  DAILY TREATMENT     [facility] [address], [address]  [place] [address]  Phone:  [phone]    Date: 05/16/2019    Patient: Radha Mcfarlane  YOB: 1960  MRN: [id number]     Time Calculation  Start time: 0330  Stop time: 0400 Time Calculation (min): 30 minutes     Chief Complaint: No chief complaint on file.    Visit #: 7    SUBJECTIVE:  Reduction in tension on L. Neck compared to last tx session. I had a virus but has run it's course.     OBJECTIVE:  Current objective measures:   Lack of rotation L. Small  Pain with flexion anterolateral left neck  TTP SCM          Therapeutic Treatments and Modalities:     1. Manual Therapy (CPT 64090), cervical, SCS for SCOM tenderpoint on left neck     Time-based treatments/modalities:  Manual therapy minutes (CPT 82561): 15 minutes  Functional training, self care minutes (CPT 96671): 15 minutes           ASSESSMENT:   Response to treatment: Focus of session was reducing hypertonicity in left anterolateral neck, as well as myofascial tender points in this region. Strain counter strain techniques used with some reduction in tone, and patient reported some reduction in symptoms. She was educated on how to perform technique at home.     PLAN/RECOMMENDATIONS:   Plan for treatment: therapy treatment to continue next visit.  Planned interventions for next visit: continue with current treatment.

## 2019-05-20 ENCOUNTER — PHYSICAL THERAPY (OUTPATIENT)
Dept: PHYSICAL THERAPY | Facility: REHABILITATION | Age: 59
End: 2019-05-20
Attending: INTERNAL MEDICINE
Payer: COMMERCIAL

## 2019-05-20 DIAGNOSIS — M47.812 OSTEOARTHRITIS OF CERVICAL SPINE, UNSPECIFIED SPINAL OSTEOARTHRITIS COMPLICATION STATUS: ICD-10-CM

## 2019-05-20 DIAGNOSIS — M54.2 NECK PAIN: ICD-10-CM

## 2019-05-20 PROCEDURE — 97112 NEUROMUSCULAR REEDUCATION: CPT

## 2019-05-20 NOTE — OP THERAPY DAILY TREATMENT
Outpatient Physical Therapy  DAILY TREATMENT     [facility] Outpatient Physical Therapy [address] [facility], [address]  [place] [address]  Phone:  [phone]    Date: 05/20/2019    Patient: Radha Mcfarlane  YOB: 1960  MRN: [id number]     Time Calculation  Start time: 0800  Stop time: 0830 Time Calculation (min): 30 minutes     Chief Complaint: No chief complaint on file.    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 Not feeling too much neck pain/tension today. Tried doing SCS technique at home, didn't exactly feel the release.     OBJECTIVE:  Current objective measures:           Therapeutic Treatments and Modalities:     1. Neuromuscular Re-education (CPT 78506), taping for cervical and shoulder posture, ktape, bilateral shoulders fascilitate retraction and posterior rot; cervical facilitate retraction and DCF; SCM inhibit hypertonicity     Time-based treatments/modalities:  Neuromusc re-ed, balance, coor, post minutes (CPT 95819): 30 minutes         ASSESSMENT:   Response to treatment: Focus of session was taping for posture facilitation at neck and shoulders as well as inhibition of SCM on L. Patient educated on signs and symptoms of adhesive allergic reaction. Tolerated tx well.     PLAN/RECOMMENDATIONS:   Plan for treatment: therapy treatment to continue next visit.  Planned interventions for next visit: continue with current treatment.

## 2019-05-21 ENCOUNTER — APPOINTMENT (RX ONLY)
Dept: URBAN - METROPOLITAN AREA CLINIC 35 | Facility: CLINIC | Age: 59
Setting detail: DERMATOLOGY
End: 2019-05-21

## 2019-05-21 DIAGNOSIS — L82.1 OTHER SEBORRHEIC KERATOSIS: ICD-10-CM

## 2019-05-21 DIAGNOSIS — D22 MELANOCYTIC NEVI: ICD-10-CM

## 2019-05-21 DIAGNOSIS — L72.8 OTHER FOLLICULAR CYSTS OF THE SKIN AND SUBCUTANEOUS TISSUE: ICD-10-CM

## 2019-05-21 DIAGNOSIS — Z71.89 OTHER SPECIFIED COUNSELING: ICD-10-CM

## 2019-05-21 DIAGNOSIS — L81.4 OTHER MELANIN HYPERPIGMENTATION: ICD-10-CM

## 2019-05-21 DIAGNOSIS — Z85.828 PERSONAL HISTORY OF OTHER MALIGNANT NEOPLASM OF SKIN: ICD-10-CM

## 2019-05-21 PROBLEM — D23.71 OTHER BENIGN NEOPLASM OF SKIN OF RIGHT LOWER LIMB, INCLUDING HIP: Status: ACTIVE | Noted: 2019-05-21

## 2019-05-21 PROBLEM — D48.5 NEOPLASM OF UNCERTAIN BEHAVIOR OF SKIN: Status: ACTIVE | Noted: 2019-05-21

## 2019-05-21 PROBLEM — D23.72 OTHER BENIGN NEOPLASM OF SKIN OF LEFT LOWER LIMB, INCLUDING HIP: Status: ACTIVE | Noted: 2019-05-21

## 2019-05-21 PROCEDURE — ? DEFER

## 2019-05-21 PROCEDURE — 99213 OFFICE O/P EST LOW 20 MIN: CPT

## 2019-05-21 PROCEDURE — ? COUNSELING

## 2019-05-21 ASSESSMENT — LOCATION DETAILED DESCRIPTION DERM
LOCATION DETAILED: RIGHT PROXIMAL DORSAL FOREARM
LOCATION DETAILED: RIGHT MID-UPPER BACK
LOCATION DETAILED: MID-FRONTAL SCALP
LOCATION DETAILED: RIGHT TRAGUS
LOCATION DETAILED: LEFT DISTAL PRETIBIAL REGION
LOCATION DETAILED: EPIGASTRIC SKIN

## 2019-05-21 ASSESSMENT — LOCATION SIMPLE DESCRIPTION DERM
LOCATION SIMPLE: RIGHT UPPER BACK
LOCATION SIMPLE: ANTERIOR SCALP
LOCATION SIMPLE: ABDOMEN
LOCATION SIMPLE: RIGHT EAR
LOCATION SIMPLE: RIGHT FOREARM
LOCATION SIMPLE: LEFT PRETIBIAL REGION

## 2019-05-21 ASSESSMENT — LOCATION ZONE DERM
LOCATION ZONE: LEG
LOCATION ZONE: ARM
LOCATION ZONE: EAR
LOCATION ZONE: SCALP
LOCATION ZONE: TRUNK

## 2019-05-21 NOTE — PROCEDURE: DEFER
Procedure To Be Performed At Next Visit: Biopsy by shave method
Introduction Text (Please End With A Colon): :)
Detail Level: Detailed

## 2019-05-23 ENCOUNTER — PHYSICAL THERAPY (OUTPATIENT)
Dept: PHYSICAL THERAPY | Facility: REHABILITATION | Age: 59
End: 2019-05-23
Attending: INTERNAL MEDICINE
Payer: COMMERCIAL

## 2019-05-23 DIAGNOSIS — M54.2 NECK PAIN: ICD-10-CM

## 2019-05-23 DIAGNOSIS — M47.812 OSTEOARTHRITIS OF CERVICAL SPINE, UNSPECIFIED SPINAL OSTEOARTHRITIS COMPLICATION STATUS: ICD-10-CM

## 2019-05-23 PROCEDURE — 97140 MANUAL THERAPY 1/> REGIONS: CPT

## 2019-05-23 PROCEDURE — 97014 ELECTRIC STIMULATION THERAPY: CPT

## 2019-05-23 PROCEDURE — 97535 SELF CARE MNGMENT TRAINING: CPT

## 2019-05-23 PROCEDURE — 97112 NEUROMUSCULAR REEDUCATION: CPT

## 2019-05-23 NOTE — OP THERAPY DAILY TREATMENT
Outpatient Physical Therapy  DAILY TREATMENT     [facility] Outpatient Physical Therapy [place] [address] [facility], [address]  [place] [address]  Phone:  [phone]    Date: 05/23/2019    Patient: Radha Mcfarlane  YOB: 1960  MRN: [id number]     Time Calculation  Start time: 0300  Stop time: 0400 Time Calculation (min): 60 minutes     Chief Complaint: No chief complaint on file.    Visit #: 9    SUBJECTIVE:  Taping helped, I felt fatigue afterwards from working to keep better posture and the SCM muscle was less tense. No need for taping today, field trip tomorrow.     OBJECTIVE:  Current objective measures:           Therapeutic Exercises (CPT 94318):     1. Cervical retraction, 10 x 5'' holds    2. OA nods, 15 x 2 w/ breaks, heavy cuing needed for not recruiting SCM    3. Shoulder extension w/ scap retraction, pink 1 x 15 x 5'' holds    Therapeutic Treatments and Modalities:     1. Neuromuscular Re-education (CPT 00408), DCF recruitment without SCM     2. Manual Therapy (CPT 36821), cervical , OA mob post gl Gr IV; STW paraspinals into extension; SCM sustained pressure w/ rotation    3. E Stim Unattended (CPT 19005), Cervical, IFC and MHP 15 min    Time-based treatments/modalities:  Manual therapy minutes (CPT 79258): 15 minutes  Therapeutic exercise minutes (CPT 97163): 5 minutes  Functional training, self care minutes (CPT 15384): 7 minutes  Neuromusc re-ed, balance, coor, post minutes (CPT 65759): 15 minutes         ASSESSMENT:   Response to treatment: Patient has poor coordination for deep cervical flexion. We made progress to improve but follow up training needed. Reduced complaint of cervical tension since doing postural taping this week.     PLAN/RECOMMENDATIONS:   Plan for treatment: therapy treatment to continue next visit.  Planned interventions for next visit: continue with current treatment.

## 2019-05-28 ENCOUNTER — PHYSICAL THERAPY (OUTPATIENT)
Dept: PHYSICAL THERAPY | Facility: REHABILITATION | Age: 59
End: 2019-05-28
Attending: INTERNAL MEDICINE
Payer: COMMERCIAL

## 2019-05-28 DIAGNOSIS — M47.812 OSTEOARTHRITIS OF CERVICAL SPINE, UNSPECIFIED SPINAL OSTEOARTHRITIS COMPLICATION STATUS: ICD-10-CM

## 2019-05-28 DIAGNOSIS — M54.2 NECK PAIN: ICD-10-CM

## 2019-05-28 PROCEDURE — 97112 NEUROMUSCULAR REEDUCATION: CPT

## 2019-05-28 NOTE — OP THERAPY DAILY TREATMENT
Outpatient Physical Therapy  DAILY TREATMENT     [facility] Outpatient Physical Therapy [address] [facility], [address]  [place] [address]  Phone:  [phone]    Date: 05/28/2019    Patient: Radha Mcfarlane  YOB: 1960  MRN: [id number]     Time Calculation  Start time: 0330  Stop time: 0400 Time Calculation (min): 30 minutes     Chief Complaint: No chief complaint on file.    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     OBJECTIVE:  Current objective measures:           Therapeutic Exercises (CPT 66557):     1. Cervical retraction, 10 x 5'' holds    2. OA nods, 15 x 2 w/ breaks, heavy cuing needed for not recruiting SCM    3. Shoulder extension w/ scap retraction, pink 1 x 15 x 5'' holds    Therapeutic Treatments and Modalities:     1. Neuromuscular Re-education (CPT 24516), DCF recruitment without SCM     2. Manual Therapy (CPT 63264), cervical , OA mob post gl Gr IV; STW paraspinals into extension; SCM sustained pressure w/ rotation    3. E Stim Unattended (CPT 30111), Cervical, IFC and MHP 15 min    4. Manual Therapy (CPT 93033), Taping for shoulder posture    Time-based treatments/modalities:  Neuromusc re-ed, balance, coor, post minutes (CPT 73173): 30 minutes         ASSESSMENT:   Response to treatment: Improving DCF recruitment. When adding resistance to cervical retraction patient had difficulty not tensing SCM. In quadraped patient compensates with excessive OA extension. Taping is helping to improve postural awareness, no signs of adhesive allergy.     PLAN/RECOMMENDATIONS:   Plan for treatment: therapy treatment to continue next visit.  Planned interventions for next visit: continue with current treatment.

## 2019-05-31 ENCOUNTER — PHYSICAL THERAPY (OUTPATIENT)
Dept: PHYSICAL THERAPY | Facility: REHABILITATION | Age: 59
End: 2019-05-31
Attending: INTERNAL MEDICINE
Payer: COMMERCIAL

## 2019-05-31 DIAGNOSIS — M47.812 OSTEOARTHRITIS OF CERVICAL SPINE, UNSPECIFIED SPINAL OSTEOARTHRITIS COMPLICATION STATUS: ICD-10-CM

## 2019-05-31 DIAGNOSIS — M54.2 NECK PAIN: ICD-10-CM

## 2019-05-31 PROCEDURE — 97110 THERAPEUTIC EXERCISES: CPT

## 2019-05-31 PROCEDURE — 97140 MANUAL THERAPY 1/> REGIONS: CPT

## 2019-05-31 PROCEDURE — 97014 ELECTRIC STIMULATION THERAPY: CPT

## 2019-05-31 NOTE — OP THERAPY DAILY TREATMENT
Outpatient Physical Therapy  DAILY TREATMENT     [facility] Outpatient Physical Therapy [address] [facility], [address]  [place] [address]  Phone:  [phone]    Date: 05/31/2019    Patient: Radha Mcfarlane  YOB: 1960  MRN: [id number]     Time Calculation  Start time: 0334  Stop time: 0425 Time Calculation (min): 51 minutes     Chief Complaint: No chief complaint on file.    Visit #: 11    SUBJECTIVE:  Tight tension at left neck. (Points to proximal attachment of SCM). It's reducing in intensity overall though.     OBJECTIVE:  Current objective measures:           Therapeutic Exercises (CPT 98551):     1. Scap retraction, lower trap, w/ hold    2. Tricep extension, w/ lower trap isometric    3. Flashers on wall     4. Cervical retraction    5. OA nod     Therapeutic Treatments and Modalities:     1. Manual Therapy (CPT 68024), Cervical, SCS for SCOM on L.; traction upper cervical; STW mid cervical paraspinals     Time-based treatments/modalities:  Manual therapy minutes (CPT 15041): 25 minutes  Therapeutic exercise minutes (CPT 57682): 13 minutes         ASSESSMENT:   Response to treatment: Reduced tension after PT session. Improving coordination for DCF recruitment. Complaint of tension in necks fits with myofascial hypertonicity.     PLAN/RECOMMENDATIONS:   Plan for treatment: therapy treatment to continue next visit.  Planned interventions for next visit: continue with current treatment.

## 2019-06-06 ENCOUNTER — PHYSICAL THERAPY (OUTPATIENT)
Dept: PHYSICAL THERAPY | Facility: REHABILITATION | Age: 59
End: 2019-06-06
Attending: INTERNAL MEDICINE
Payer: COMMERCIAL

## 2019-06-06 DIAGNOSIS — M47.812 OSTEOARTHRITIS OF CERVICAL SPINE, UNSPECIFIED SPINAL OSTEOARTHRITIS COMPLICATION STATUS: ICD-10-CM

## 2019-06-06 PROCEDURE — 97140 MANUAL THERAPY 1/> REGIONS: CPT

## 2019-06-06 PROCEDURE — 97112 NEUROMUSCULAR REEDUCATION: CPT

## 2019-06-06 PROCEDURE — 97014 ELECTRIC STIMULATION THERAPY: CPT

## 2019-06-06 NOTE — OP THERAPY DAILY TREATMENT
Outpatient Physical Therapy  DAILY TREATMENT     [facility] Outpatient Physical Therapy [address], [address]  [place] [address]  Phone:  [phone]    Date: 06/06/2019    Patient: Radha Mcfarlane  YOB: 1960  MRN: [id number]     Time Calculation  Start time: 0930  Stop time: 1015 Time Calculation (min): 45 minutes     Chief Complaint: No chief complaint on file.    Visit #: 12    SUBJECTIVE:  Neck is not doing well today. A lot of looking down with grading.     OBJECTIVE:  Current objective measures:   TTP and hypertonic: L. SCM, L. Levator scap, R. > L Cs paraspinals, L > R first rib.          Therapeutic Exercises (CPT 66190):     1. Prone I's    2. Cervical retraction balloon resisted    Therapeutic Treatments and Modalities:     1. Manual Therapy (CPT 19977), cervical, thoracic, scapulothoracic, MFR SCM; OA mob post gl gr IV; OA mob L. SB gr IV; STW paraspinals tonic inhibition; prone PA T1-T7 gr IV; Inf gl L. first rib gr IV; MFR levator scap on L.     2. E Stim Unattended (CPT 35739), cervical, MHP w/ IFC 15 min duration     Time-based treatments/modalities:  Manual therapy minutes (CPT 64331): 23 minutes  Neuromusc re-ed, balance, coor, post minutes (CPT 61441): 10 minutes           ASSESSMENT:   Response to treatment: Patient reported reduction in cervical pain at end of session.     PLAN/RECOMMENDATIONS:   Plan for treatment: therapy treatment to continue next visit.  Planned interventions for next visit: continue with current treatment.

## 2019-06-10 ENCOUNTER — PHYSICAL THERAPY (OUTPATIENT)
Dept: PHYSICAL THERAPY | Facility: REHABILITATION | Age: 59
End: 2019-06-10
Attending: INTERNAL MEDICINE
Payer: COMMERCIAL

## 2019-06-10 DIAGNOSIS — M47.812 OSTEOARTHRITIS OF CERVICAL SPINE, UNSPECIFIED SPINAL OSTEOARTHRITIS COMPLICATION STATUS: ICD-10-CM

## 2019-06-10 PROCEDURE — 97140 MANUAL THERAPY 1/> REGIONS: CPT

## 2019-06-10 PROCEDURE — 97014 ELECTRIC STIMULATION THERAPY: CPT

## 2019-06-10 PROCEDURE — 97112 NEUROMUSCULAR REEDUCATION: CPT

## 2019-06-10 NOTE — OP THERAPY DAILY TREATMENT
Outpatient Physical Therapy  DAILY TREATMENT     [facility] Outpatient Physical Therapy [address] [facility], [address]  [place] [address]  Phone:  [phone]    Date: 06/10/2019    Patient: Radha Mcfarlane  YOB: 1960  MRN: [id number]     Time Calculation  Start time: 0130  Stop time: 0225 Time Calculation (min): 55 minutes     Chief Complaint: No chief complaint on file.    Visit #: 13    SUBJECTIVE:  Tension is on both sides of neck. Have not been working. It's getting frustrating.     OBJECTIVE:  Current objective measures:           Therapeutic Treatments and Modalities:     1. Neuromuscular Re-education (CPT 35796), cervical, supine dynamic stabilization with band for rotation and extension     2. Neuromuscular Re-education (CPT 73467), cervical, OA nod w/ MFR on balls; retract against ball    3. Manual Therapy (CPT 59865), cervical, assessment; MFR SCM, cervical paraspinals    4. E Stim Unattended (CPT 14608), Cervical, IFC and MHP cervical     Time-based treatments/modalities:  Manual therapy minutes (CPT 73476): 12 minutes  Neuromusc re-ed, balance, coor, post minutes (CPT 55954): 28 minutes         ASSESSMENT:   Response to treatment: Patient demonstrating instability signs at C3/4. Focus of session was progressing cervical stabilization. Reduction in neck tension with stability exercise achieved within session.     PLAN/RECOMMENDATIONS:   Plan for treatment: therapy treatment to continue next visit.  Planned interventions for next visit: continue with current treatment.

## 2019-06-11 ENCOUNTER — OFFICE VISIT (OUTPATIENT)
Dept: INTERNAL MEDICINE | Facility: MEDICAL CENTER | Age: 59
End: 2019-06-11
Payer: COMMERCIAL

## 2019-06-11 VITALS
BODY MASS INDEX: 23.1 KG/M2 | OXYGEN SATURATION: 96 % | DIASTOLIC BLOOD PRESSURE: 75 MMHG | TEMPERATURE: 99 F | HEART RATE: 67 BPM | SYSTOLIC BLOOD PRESSURE: 119 MMHG | HEIGHT: 71 IN | WEIGHT: 165 LBS

## 2019-06-11 DIAGNOSIS — E78.5 DYSLIPIDEMIA: ICD-10-CM

## 2019-06-11 DIAGNOSIS — Z00.00 HEALTH CARE MAINTENANCE: ICD-10-CM

## 2019-06-11 DIAGNOSIS — Q87.40 MARFAN SYNDROME: Chronic | ICD-10-CM

## 2019-06-11 DIAGNOSIS — Z11.59 NEED FOR HEPATITIS C SCREENING TEST: ICD-10-CM

## 2019-06-11 DIAGNOSIS — M54.2 NECK PAIN: ICD-10-CM

## 2019-06-11 PROCEDURE — 99396 PREV VISIT EST AGE 40-64: CPT | Performed by: INTERNAL MEDICINE

## 2019-06-11 RX ORDER — ALBUTEROL SULFATE 90 UG/1
AEROSOL, METERED RESPIRATORY (INHALATION)
COMMUNITY
End: 2019-10-22 | Stop reason: SDUPTHER

## 2019-06-11 NOTE — PROGRESS NOTES
X ray of the neck: She is doing PT.         Radha Mcfarlane is a 59 y.o. female who is here for an annual visit.    CC: Annual visit    HPI:    Patient is a 59-year-old female who is presenting to the clinic today for an annual visit.  She has a past medical history of Marfan syndrome, dyslipidemia, and neck pain that she has been dealing with for a while now.  Last time she was here, she was complaining of neck pain and we made a referral for her to see physical therapy.  She has been following up with physical therapy but has not noticed as much improvement as she had hoped for.  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  Physical therapy did give her exercises that she can do at home and last time she was here, she was also given a booklet of exercises that she can do for her neck at home.  She says she has been doing well along with massage therapy but it is not showing as much improvement as she had hoped for.  She only takes Flexeril when she absolutely needs it and she tries not to take it because of school but now that school is out she is more open to that idea.  She only takes Motrin or Tylenol low dose if she needs it for the pain but tries to stay away from it.    She is due for lab work and does qualify for hepatitis C screening.  She had a mammogram done in October 2018, she is up-to-date on colonoscopy and Pap smear.    No problem-specific Assessment & Plan notes found for this encounter.      She  has a past medical history of Anemia (2/13/2017); Anesthesia; Asthma; Breath shortness; Constipation; Gynecological disorder; Insomnia; Left shoulder pain; Marfan syndrome (9/16/2010); Neck pain (2/13/2017); Neutropenia (HCC); Osteoarthritis (2/13/2017); and Pulmonary nodule (2/13/2017). She also has no past medical history of Breast cancer (HCC).    Patient Active Problem List  "   Diagnosis Date Noted   • Health care maintenance 05/31/2017   • Post-menopause 05/31/2017   • Right shoulder pain 05/31/2017   • Back spasm 05/31/2017   • Dyslipidemia 05/30/2017   • FH: breast cancer 02/13/2017   • Pulmonary nodule 02/13/2017   • Neck pain 02/13/2017   • Osteoarthritis 02/13/2017   • Incomplete RBBB 10/30/2012   • Marfan syndrome 09/16/2010       Allergies:Patient has no known allergies.    Current Outpatient Prescriptions   Medication Sig Dispense Refill   • loratadine (CLARITIN) 10 MG Tab Take 10 mg by mouth every day.     • albuterol (PROVENTIL HFA) 108 (90 Base) MCG/ACT Aero Soln inhalation aerosol PROVENTIL  (90 Base) MCG/ACT AERS     • cyclobenzaprine (FLEXERIL) 5 MG tablet Take 1-2 Tabs by mouth 3 times a day as needed. 30 Tab 0   • Diclofenac Sodium 1 % Gel Apply 1 g to skin as directed 3 times a day as needed. 1 Tube 3   • ibuprofen (MOTRIN) 200 MG Tab 1-3 PO Q6H PRN pain     • acetaminophen (TYLENOL) 325 MG TABS Take 650 mg by mouth Once. Prior to Iron Dextran infusion       No current facility-administered medications for this visit.        Social History   Substance Use Topics   • Smoking status: Never Smoker   • Smokeless tobacco: Never Used   • Alcohol use 3.6 oz/week     6 Glasses of wine per week       Family History   Problem Relation Age of Onset   • Heart Attack Mother    • Lung Disease Mother    • Breast Cancer Mother 52   • Stroke Father      Review of Systems:   Pertinent positives as stated in HPI, all others reviewed as negative.    Physical Exam:  /75 (BP Location: Right arm, Patient Position: Sitting, BP Cuff Size: Adult)   Pulse 67   Temp 37.2 °C (99 °F) (Temporal)   Ht 1.803 m (5' 11\")   Wt 74.8 kg (165 lb)   SpO2 96%  Body mass index is 23.01 kg/m².    General Appearance: healthy, very tall, alert, no distress, cooperative.   Skin: No rashes or lesions.  Head: Normocephalic. No masses appreciated.   Eyes: PERRLA.  Oropharynx: Oropharynx moist and " without lesion.  Neck: Neck supple. No adenopathy.   Lungs:  Lungs clear to auscultation bilaterally.  Heart: RRR without murmur, gallop, or rubs.  Abdomen: Soft, non-tender. BS normal.   Musculoskeletal: Extremities: Upper and lower extremities appear normal. No deformities, edema.   Peripheral Pulses: Pulses: radial=4/4, dorsalis pedis=4/4  Psychiatric: Mood appears normal.     Assessment/Plan:     1. Health care maintenance  Routine labs ordered.  Mammogram up to date, 10/2018.   Up to date on colonoscopy, PAP Smear.  Up to date on vaccinations.  Sees eye doctor and dentist.   No other concerns.   - CBC WITH DIFFERENTIAL; Future  - Comp Metabolic Panel; Future    2. Dyslipidemia  Last lipid profile was showing elevations in LDL. Repeat and calculate 10 year cardiac risk. No other risk factor such as HTN, Diabetes, No CVA/CAD, non-smoker.   - Lipid Profile; Future    3. Neck pain  Is undergoing physical therapy and taking over-the-counter medication as needed.  She had a x-ray of the cervical spine many years ago.  Is interested in repeat imaging so we will order that and see if it shows anything significant.  - DX-CERVICAL SPINE-2 OR 3 VIEWS; Future  - VITAMIN D,25 HYDROXY; Future    4. Marfan syndrome  No acute issues or changes.  Last echocardiogram was in 2017 which was normal.    5. Need for hepatitis C screening test  - HEP C VIRUS ANTIBODY; Future      Followup: Return in about 1 year (around 6/11/2020), or if symptoms worsen or fail to improve.    This note was created using voice recognition software. There may be unintended errors spelling, and grammar or content.

## 2019-06-12 ENCOUNTER — HOSPITAL ENCOUNTER (OUTPATIENT)
Dept: LAB | Facility: MEDICAL CENTER | Age: 59
End: 2019-06-12
Attending: INTERNAL MEDICINE
Payer: COMMERCIAL

## 2019-06-12 ENCOUNTER — HOSPITAL ENCOUNTER (OUTPATIENT)
Dept: RADIOLOGY | Facility: MEDICAL CENTER | Age: 59
End: 2019-06-12
Attending: INTERNAL MEDICINE
Payer: COMMERCIAL

## 2019-06-12 DIAGNOSIS — Z11.59 NEED FOR HEPATITIS C SCREENING TEST: ICD-10-CM

## 2019-06-12 DIAGNOSIS — E78.5 DYSLIPIDEMIA: ICD-10-CM

## 2019-06-12 DIAGNOSIS — M54.2 NECK PAIN: ICD-10-CM

## 2019-06-12 DIAGNOSIS — Z00.00 HEALTH CARE MAINTENANCE: ICD-10-CM

## 2019-06-12 LAB
25(OH)D3 SERPL-MCNC: 25 NG/ML (ref 30–100)
ALBUMIN SERPL BCP-MCNC: 4.3 G/DL (ref 3.2–4.9)
ALBUMIN/GLOB SERPL: 1.6 G/DL
ALP SERPL-CCNC: 55 U/L (ref 30–99)
ALT SERPL-CCNC: 16 U/L (ref 2–50)
ANION GAP SERPL CALC-SCNC: 5 MMOL/L (ref 0–11.9)
AST SERPL-CCNC: 17 U/L (ref 12–45)
BASOPHILS # BLD AUTO: 2.1 % (ref 0–1.8)
BASOPHILS # BLD: 0.07 K/UL (ref 0–0.12)
BILIRUB SERPL-MCNC: 0.6 MG/DL (ref 0.1–1.5)
BUN SERPL-MCNC: 19 MG/DL (ref 8–22)
CALCIUM SERPL-MCNC: 9.3 MG/DL (ref 8.5–10.5)
CHLORIDE SERPL-SCNC: 107 MMOL/L (ref 96–112)
CHOLEST SERPL-MCNC: 261 MG/DL (ref 100–199)
CO2 SERPL-SCNC: 27 MMOL/L (ref 20–33)
CREAT SERPL-MCNC: 0.78 MG/DL (ref 0.5–1.4)
EOSINOPHIL # BLD AUTO: 0.18 K/UL (ref 0–0.51)
EOSINOPHIL NFR BLD: 5.3 % (ref 0–6.9)
ERYTHROCYTE [DISTWIDTH] IN BLOOD BY AUTOMATED COUNT: 46 FL (ref 35.9–50)
GLOBULIN SER CALC-MCNC: 2.7 G/DL (ref 1.9–3.5)
GLUCOSE SERPL-MCNC: 76 MG/DL (ref 65–99)
HCT VFR BLD AUTO: 46.1 % (ref 37–47)
HCV AB SER QL: NEGATIVE
HDLC SERPL-MCNC: 72 MG/DL
HGB BLD-MCNC: 14.6 G/DL (ref 12–16)
IMM GRANULOCYTES # BLD AUTO: 0 K/UL (ref 0–0.11)
IMM GRANULOCYTES NFR BLD AUTO: 0 % (ref 0–0.9)
LDLC SERPL CALC-MCNC: 174 MG/DL
LYMPHOCYTES # BLD AUTO: 1.55 K/UL (ref 1–4.8)
LYMPHOCYTES NFR BLD: 45.7 % (ref 22–41)
MCH RBC QN AUTO: 30.4 PG (ref 27–33)
MCHC RBC AUTO-ENTMCNC: 31.7 G/DL (ref 33.6–35)
MCV RBC AUTO: 95.8 FL (ref 81.4–97.8)
MONOCYTES # BLD AUTO: 0.31 K/UL (ref 0–0.85)
MONOCYTES NFR BLD AUTO: 9.1 % (ref 0–13.4)
NEUTROPHILS # BLD AUTO: 1.28 K/UL (ref 2–7.15)
NEUTROPHILS NFR BLD: 37.8 % (ref 44–72)
NRBC # BLD AUTO: 0 K/UL
NRBC BLD-RTO: 0 /100 WBC
PLATELET # BLD AUTO: 195 K/UL (ref 164–446)
PMV BLD AUTO: 10.9 FL (ref 9–12.9)
POTASSIUM SERPL-SCNC: 3.9 MMOL/L (ref 3.6–5.5)
PROT SERPL-MCNC: 7 G/DL (ref 6–8.2)
RBC # BLD AUTO: 4.81 M/UL (ref 4.2–5.4)
SODIUM SERPL-SCNC: 139 MMOL/L (ref 135–145)
TRIGL SERPL-MCNC: 73 MG/DL (ref 0–149)
WBC # BLD AUTO: 3.4 K/UL (ref 4.8–10.8)

## 2019-06-12 PROCEDURE — 85025 COMPLETE CBC W/AUTO DIFF WBC: CPT

## 2019-06-12 PROCEDURE — 80053 COMPREHEN METABOLIC PANEL: CPT

## 2019-06-12 PROCEDURE — 36415 COLL VENOUS BLD VENIPUNCTURE: CPT

## 2019-06-12 PROCEDURE — 82306 VITAMIN D 25 HYDROXY: CPT

## 2019-06-12 PROCEDURE — 86803 HEPATITIS C AB TEST: CPT

## 2019-06-12 PROCEDURE — 72040 X-RAY EXAM NECK SPINE 2-3 VW: CPT

## 2019-06-12 PROCEDURE — 80061 LIPID PANEL: CPT

## 2019-06-13 ENCOUNTER — PHYSICAL THERAPY (OUTPATIENT)
Dept: PHYSICAL THERAPY | Facility: REHABILITATION | Age: 59
End: 2019-06-13
Attending: INTERNAL MEDICINE
Payer: COMMERCIAL

## 2019-06-13 DIAGNOSIS — M54.2 NECK PAIN: ICD-10-CM

## 2019-06-13 DIAGNOSIS — M47.812 OSTEOARTHRITIS OF CERVICAL SPINE, UNSPECIFIED SPINAL OSTEOARTHRITIS COMPLICATION STATUS: ICD-10-CM

## 2019-06-13 PROCEDURE — 97014 ELECTRIC STIMULATION THERAPY: CPT

## 2019-06-13 PROCEDURE — 97112 NEUROMUSCULAR REEDUCATION: CPT

## 2019-06-13 PROCEDURE — 97140 MANUAL THERAPY 1/> REGIONS: CPT

## 2019-06-13 NOTE — OP THERAPY DAILY TREATMENT
Outpatient Physical Therapy  DAILY TREATMENT     [facility] Outpatient Physical Therapy [address], [address]  [place] [address]  Phone:  [phone]    Date: 06/13/2019    Patient: Radha Mcfarlane  YOB: 1960  MRN: [id number]     Time Calculation  Start time: 0900  Stop time: 0945 Time Calculation (min): 45 minutes     Chief Complaint: No chief complaint on file.    Visit #: 14    SUBJECTIVE:  Had x-ray of neck. Going hiking this weekend, carrying back pack, sleeping outside, 7 miles. Concerned it will flare up neck.       OBJECTIVE:  Current objective measures:           Therapeutic Treatments and Modalities:     1. Neuromuscular Re-education (CPT 33380), cervical, supine dynamic stabilization with band for rotation and extension     2. Neuromuscular Re-education (CPT 55746), cervical, OA nod w/ MFR on balls; retract against ball    3. Manual Therapy (CPT 05661), cervical, assessment; MFR SCM, cervical paraspinals; post gl OA gr IV;     4. E Stim Unattended (CPT 59476), Cervical, IFC and MHP cervical     Time-based treatments/modalities:  Manual therapy minutes (CPT 87804): 15 minutes  Neuromusc re-ed, balance, coor, post minutes (CPT 52729): 15 minutes         ASSESSMENT:   Response to treatment: improving DCF strength. Reduced mm tone in neck today. Reviewed radiograph, mild-mod facet joint OA at mid cervical spine. Some slight retrolisthesis mid cervical spine. Pt tolerated tx well.     PLAN/RECOMMENDATIONS:   Plan for treatment: therapy treatment to continue next visit.  Planned interventions for next visit: continue with current treatment.

## 2019-06-18 ENCOUNTER — PHYSICAL THERAPY (OUTPATIENT)
Dept: PHYSICAL THERAPY | Facility: REHABILITATION | Age: 59
End: 2019-06-18
Attending: INTERNAL MEDICINE
Payer: COMMERCIAL

## 2019-06-18 DIAGNOSIS — M47.812 OSTEOARTHRITIS OF CERVICAL SPINE, UNSPECIFIED SPINAL OSTEOARTHRITIS COMPLICATION STATUS: ICD-10-CM

## 2019-06-18 PROCEDURE — 97110 THERAPEUTIC EXERCISES: CPT

## 2019-06-18 PROCEDURE — 97112 NEUROMUSCULAR REEDUCATION: CPT

## 2019-06-18 PROCEDURE — 97014 ELECTRIC STIMULATION THERAPY: CPT

## 2019-06-18 NOTE — OP THERAPY DAILY TREATMENT
Outpatient Physical Therapy  DAILY TREATMENT     [facility] Outpatient Physical Therapy [place] [address] [facility], [address]  [place] [address]  Phone:  [phone]    Date: 06/18/2019    Patient: Radha Mcfarlane  YOB: 1960  MRN: [id number]     Time Calculation  Start time: 0230  Stop time: 0320 Time Calculation (min): 50 minutes     Chief Complaint: No chief complaint on file.    Visit #: 15    SUBJECTIVE:  Neck is feeling good today, it did not bother me when I went hiking. Working on HEP and shoulder exercises help reduce shoulder tension.     OBJECTIVE:  Current objective measures:           Therapeutic Exercises (CPT 02731):     1. Thoracic ext mob on double ball     2. Thoracic scoops, hypermobile at TL for extension    Therapeutic Treatments and Modalities:     1. Neuromuscular Re-education (CPT 90559), cervical, supine dynamic stabilization with band for rotation and extension     2. Neuromuscular Re-education (CPT 75673), cervical, OA nod w/ MFR on balls; retract against ball    3. Manual Therapy (CPT 57587), cervical, assessment; MFR SCM, cervical paraspinals; post gl OA gr IV;     4. E Stim Unattended (CPT 73350), Cervical, IFC and MHP cervical     Time-based treatments/modalities:  Manual therapy minutes (CPT 95397): 10 minutes  Therapeutic exercise minutes (CPT 79518): 15 minutes  Neuromusc re-ed, balance, coor, post minutes (CPT 74571): 10 minutes         ASSESSMENT:   Response to treatment: Progressed program to address hypomobility at upper thoracic spine. Reduced muscle tension in neck. Needed feedback for correct segmental cervical flexion.     PLAN/RECOMMENDATIONS:   Plan for treatment: therapy treatment to continue next visit.  Planned interventions for next visit: continue with current treatment.

## 2019-06-20 ENCOUNTER — PHYSICAL THERAPY (OUTPATIENT)
Dept: PHYSICAL THERAPY | Facility: REHABILITATION | Age: 59
End: 2019-06-20
Attending: INTERNAL MEDICINE
Payer: COMMERCIAL

## 2019-06-20 DIAGNOSIS — M47.812 OSTEOARTHRITIS OF CERVICAL SPINE, UNSPECIFIED SPINAL OSTEOARTHRITIS COMPLICATION STATUS: ICD-10-CM

## 2019-06-20 PROCEDURE — 97012 MECHANICAL TRACTION THERAPY: CPT

## 2019-06-20 PROCEDURE — 97140 MANUAL THERAPY 1/> REGIONS: CPT

## 2019-06-20 PROCEDURE — 97112 NEUROMUSCULAR REEDUCATION: CPT

## 2019-06-20 PROCEDURE — 97535 SELF CARE MNGMENT TRAINING: CPT

## 2019-06-20 NOTE — OP THERAPY DAILY TREATMENT
Outpatient Physical Therapy  DAILY TREATMENT     [facility] Outpatient Physical Therapy [place] [address] [facility], [address]  [place] [address]  Phone:  [phone]    Date: 06/20/2019    Patient: Radha Mcfarlane  YOB: 1960  MRN: [id number]     Time Calculation  Start time: 0235  Stop time: 0400 Time Calculation (min): 85 minutes     Chief Complaint: No chief complaint on file.    Visit #: 16    SUBJECTIVE:  Neck locked up in yoga. They had me lay on stomach with head fully rotated, could not rotate out when head was turned to left.     OBJECTIVE:  Current objective measures:           Therapeutic Exercises (CPT 04432):     2. Thoracic scoops, hypermobile at TL for extension    Therapeutic Treatments and Modalities:     1. Neuromuscular Re-education (CPT 72024), cervical, supine dynamic stabilization with band for rotation and extension     2. Neuromuscular Re-education (CPT 66484), cervical, OA nod w/ MFR on balls; retract against ball    3. Manual Therapy (CPT 68822), cervical, assessment; MFR SCM, cervical paraspinals; post gl OA gr IV;     4. Mechanical Traction (CPT 95301), Cervical, 17/8#, 60/20t, mhp, 15 min duration     5. Manual Therapy (CPT 46975), thoracic, seated extension and rotation mob. Gr IV     6. Functional Training, Self Care (CPT 01892), cervical       Comment: home traction with theraband 2    Time-based treatments/modalities:  Manual therapy minutes (CPT 97709): 15 minutes  Therapeutic exercise minutes (CPT 38666): 8 minutes  Functional training, self care minutes (CPT 62545): 10 minutes  Neuromusc re-ed, balance, coor, post minutes (CPT 52775): 15 minutes         ASSESSMENT:   Response to treatment: Thoracic extension mobilization limited by patient reporting R. Shoulder and arm pain. Reporting history of bursitis, possibly coracoid, that has not fully resolved. Progressing well in dynamic stabilization exercise. Trial of cervical traction done, patient reported good response at  end of session.     PLAN/RECOMMENDATIONS:   Plan for treatment: therapy treatment to continue next visit.  Planned interventions for next visit: continue with current treatment.

## 2019-06-24 ENCOUNTER — APPOINTMENT (RX ONLY)
Dept: URBAN - METROPOLITAN AREA CLINIC 35 | Facility: CLINIC | Age: 59
Setting detail: DERMATOLOGY
End: 2019-06-24

## 2019-06-24 DIAGNOSIS — D22 MELANOCYTIC NEVI: ICD-10-CM

## 2019-06-24 PROBLEM — D48.5 NEOPLASM OF UNCERTAIN BEHAVIOR OF SKIN: Status: ACTIVE | Noted: 2019-06-24

## 2019-06-24 PROCEDURE — ? BIOPSY BY SHAVE METHOD

## 2019-06-24 PROCEDURE — 11102 TANGNTL BX SKIN SINGLE LES: CPT

## 2019-06-24 ASSESSMENT — LOCATION DETAILED DESCRIPTION DERM: LOCATION DETAILED: LEFT LATERAL DISTAL PRETIBIAL REGION

## 2019-06-24 ASSESSMENT — LOCATION SIMPLE DESCRIPTION DERM: LOCATION SIMPLE: LEFT PRETIBIAL REGION

## 2019-06-24 ASSESSMENT — LOCATION ZONE DERM: LOCATION ZONE: LEG

## 2019-06-24 NOTE — PROCEDURE: BIOPSY BY SHAVE METHOD
Detail Level: Detailed
Dressing: pressure dressing
Type Of Destruction Used: Curettage
Render In Bullet Format When Appropriate: No
Anesthesia Type: 1% lidocaine with 1:100,000 epinephrine and a 1:10 solution of 8.4% sodium bicarbonate
Post-Care Instructions: I reviewed with the patient in detail post-care instructions. Patient is to keep the biopsy site dry overnight, and then apply white petrolatum twice daily until healed. Patient may apply hydrogen peroxide soaks to remove any crusting.
Biopsy Type: H and E
Lab Facility: 
Billing Type: Third-Party Bill
X Size Of Lesion In Cm: 0
Hemostasis: Aluminum Chloride
Depth Of Biopsy: dermis
Lab: [id number]
Render Post-Care Instructions In Note?: yes
Wound Care: Petrolatum
Anesthesia Volume In Cc: 1.7
Curettage Text: The wound bed was treated with curettage after the biopsy was performed.
Biopsy Method: Dermablade
Notification Instructions: Patient will be notified of biopsy results. However, patient instructed to call the office if not contacted within 2 weeks.
Consent: Written consent was obtained and risks were reviewed including but not limited to scarring, infection, bleeding, scabbing, incomplete removal, nerve damage and allergy to anesthesia.

## 2019-06-26 ENCOUNTER — PHYSICAL THERAPY (OUTPATIENT)
Dept: PHYSICAL THERAPY | Facility: REHABILITATION | Age: 59
End: 2019-06-26
Attending: INTERNAL MEDICINE
Payer: COMMERCIAL

## 2019-06-26 DIAGNOSIS — M47.812 OSTEOARTHRITIS OF CERVICAL SPINE, UNSPECIFIED SPINAL OSTEOARTHRITIS COMPLICATION STATUS: ICD-10-CM

## 2019-06-26 PROCEDURE — 97112 NEUROMUSCULAR REEDUCATION: CPT

## 2019-06-26 PROCEDURE — 97110 THERAPEUTIC EXERCISES: CPT

## 2019-06-26 PROCEDURE — 97012 MECHANICAL TRACTION THERAPY: CPT

## 2019-06-26 NOTE — OP THERAPY DAILY TREATMENT
Outpatient Physical Therapy  DAILY TREATMENT     [facility] Outpatient Physical Therapy [address] [facility], [address]  [place] [address]  Phone:  [phone]    Date: 06/26/2019    Patient: Radha Mcfarlane  YOB: 1960  MRN: [id number]     Time Calculation  Start time: 0830  Stop time: 0915 Time Calculation (min): 45 minutes     Chief Complaint: No chief complaint on file.    Visit #: 17    SUBJECTIVE:  I was really sore, but I think in a good way. Soreness was diffuse over the whole spine. It felt like a good workout.  Neck feels great today.     OBJECTIVE:  Current objective measures:       Full cervical AROM no pain, good quality of motion  Hypertonic L upper trap      Therapeutic Exercises (CPT 16996):     1. Scap retraction, pink 2 x 15    2. Sh. adduction, pink 2 x 15    3. Double ball mob thoracic ext, 10 x 3    4. Thoracic scoops, 15    5. Balloon cervical retraction; ret. + rot, 5 min     Therapeutic Treatments and Modalities:     1. Mechanical Traction (CPT 47940), Cervical, 17/8# and 60/20t w/ MHP    Time-based treatments/modalities:  Therapeutic exercise minutes (CPT 37702): 20 minutes  Neuromusc re-ed, balance, coor, post minutes (CPT 89439): 10 minutes         ASSESSMENT:   Response to treatment: patient had good cervical mobility without much muscle tension today. Focus of session was exercise and stretches for improving postural endurance, patient responding well to cervical traction.     PLAN/RECOMMENDATIONS:   Plan for treatment: therapy treatment to continue next visit.  Planned interventions for next visit: continue with current treatment.

## 2019-06-28 ENCOUNTER — PHYSICAL THERAPY (OUTPATIENT)
Dept: PHYSICAL THERAPY | Facility: REHABILITATION | Age: 59
End: 2019-06-28
Attending: INTERNAL MEDICINE
Payer: COMMERCIAL

## 2019-06-28 DIAGNOSIS — M47.812 OSTEOARTHRITIS OF CERVICAL SPINE, UNSPECIFIED SPINAL OSTEOARTHRITIS COMPLICATION STATUS: ICD-10-CM

## 2019-06-28 PROCEDURE — 97012 MECHANICAL TRACTION THERAPY: CPT

## 2019-06-28 PROCEDURE — 97535 SELF CARE MNGMENT TRAINING: CPT

## 2019-06-28 PROCEDURE — 97112 NEUROMUSCULAR REEDUCATION: CPT

## 2019-06-28 NOTE — OP THERAPY DAILY TREATMENT
Outpatient Physical Therapy  DAILY TREATMENT     [facility] Outpatient Physical Therapy [address] [facility], [address]  [place] [address]  Phone:  [phone]    Date: 06/28/2019    Patient: Radha Mcfarlane  YOB: 1960  MRN: [id number]     Time Calculation  Start time: 0900  Stop time: 0945 Time Calculation (min): 45 minutes     Chief Complaint: No chief complaint on file.    Visit #: 18    SUBJECTIVE:  Had a really bad day yesterday, I than did exercises and stretches and took mm relaxer and slept well. Feel much better today.   Symptom was tightness in neck and difficulty turning head without neck pain.       OBJECTIVE:  Current objective measures:           Therapeutic Exercises (CPT 18485):     2. Thoracic scoops, hypermobile at TL for extension    Therapeutic Treatments and Modalities:     1. Neuromuscular Re-education (CPT 43633), cervical, supine dynamic stabilization with band for rotation and extension     2. Neuromuscular Re-education (CPT 86954), cervical, OA nod w/ MFR on balls; retract against ball    3. Manual Therapy (CPT 43518), cervical, assessment; MFR SCM, cervical paraspinals; post gl OA gr IV;     4. Mechanical Traction (CPT 22924), Cervical, 17/8#, 60/20t, mhp, 15 min duration     5. Manual Therapy (CPT 57074), thoracic, seated extension and rotation mob. Gr IV     6. Functional Training, Self Care (CPT 54560), cervical       Comment: home traction with theraband 2    Time-based treatments/modalities:  Manual therapy minutes (CPT 63889): 10 minutes  Functional training, self care minutes (CPT 31011): 10 minutes  Neuromusc re-ed, balance, coor, post minutes (CPT 46720): 10 minutes         ASSESSMENT:   Response to treatment: Focus of session was reviewing HEP strategies for reducing muscle tension, for improving postural strength and how some would be done when symptomatic for symptom reduction and others are done to make progress.     PLAN/RECOMMENDATIONS:   Plan for treatment:  therapy treatment to continue next visit.  Planned interventions for next visit: continue with current treatment.

## 2019-07-03 ENCOUNTER — PHYSICAL THERAPY (OUTPATIENT)
Dept: PHYSICAL THERAPY | Facility: REHABILITATION | Age: 59
End: 2019-07-03
Attending: INTERNAL MEDICINE
Payer: COMMERCIAL

## 2019-07-03 DIAGNOSIS — M54.2 NECK PAIN: ICD-10-CM

## 2019-07-03 DIAGNOSIS — M47.812 OSTEOARTHRITIS OF CERVICAL SPINE, UNSPECIFIED SPINAL OSTEOARTHRITIS COMPLICATION STATUS: ICD-10-CM

## 2019-07-03 PROCEDURE — 97112 NEUROMUSCULAR REEDUCATION: CPT

## 2019-07-03 PROCEDURE — 97140 MANUAL THERAPY 1/> REGIONS: CPT

## 2019-07-03 PROCEDURE — 97014 ELECTRIC STIMULATION THERAPY: CPT

## 2019-07-05 ENCOUNTER — PHYSICAL THERAPY (OUTPATIENT)
Dept: PHYSICAL THERAPY | Facility: REHABILITATION | Age: 59
End: 2019-07-05
Attending: INTERNAL MEDICINE
Payer: COMMERCIAL

## 2019-07-05 DIAGNOSIS — M54.2 NECK PAIN: ICD-10-CM

## 2019-07-05 DIAGNOSIS — M47.812 OSTEOARTHRITIS OF CERVICAL SPINE, UNSPECIFIED SPINAL OSTEOARTHRITIS COMPLICATION STATUS: ICD-10-CM

## 2019-07-05 PROCEDURE — 97140 MANUAL THERAPY 1/> REGIONS: CPT

## 2019-07-05 PROCEDURE — 97110 THERAPEUTIC EXERCISES: CPT

## 2019-07-05 PROCEDURE — 97014 ELECTRIC STIMULATION THERAPY: CPT

## 2019-07-05 NOTE — OP THERAPY DAILY TREATMENT
Outpatient Physical Therapy  DAILY TREATMENT     [facility] Outpatient Physical Therapy [address], [address]  [place] [address]  Phone:  [phone]    Date: 07/05/2019    Patient: Radha Mcfarlane  YOB: 1960  MRN: [id number]     Time Calculation  Start time: 0800  Stop time: 0845 Time Calculation (min): 45 minutes     Chief Complaint: No chief complaint on file.    Visit #: 20    SUBJECTIVE:  Very low symptoms today. I spent the day yesterday scraping popcorn off the ceiling.     OBJECTIVE:  Current objective measures:             Therapeutic Exercises (CPT 83078):     1. Wall Sh. ext , for lower trap    2. Band resisted extension at full flexion, for lower trap    3. Thoracic scoops    4. Prone I's    5. [place]aranga to downward dog    Therapeutic Treatments and Modalities:     1. E Stim Unattended (CPT 06874), Cervicoscapular, IFC and MHP 15 min duration     2. Manual Therapy (CPT 11800), thoracic, Prone PA T1-T7 gr IV and rotational PA T1-T4 gr III    Time-based treatments/modalities:  Manual therapy minutes (CPT 39711): 15 minutes  Therapeutic exercise minutes (CPT 73514): 20 minutes         ASSESSMENT:   Response to treatment: Focus of session was reducing thoracic stiffness and strengthening muscles for supporting a more extended upper thoracic spine. Tolerated tx well.     PLAN/RECOMMENDATIONS:   Plan for treatment: therapy treatment to continue next visit.  Planned interventions for next visit: continue with current treatment.

## 2019-07-09 ENCOUNTER — PHYSICAL THERAPY (OUTPATIENT)
Dept: PHYSICAL THERAPY | Facility: REHABILITATION | Age: 59
End: 2019-07-09
Attending: INTERNAL MEDICINE
Payer: COMMERCIAL

## 2019-07-09 DIAGNOSIS — M54.2 NECK PAIN: ICD-10-CM

## 2019-07-09 DIAGNOSIS — M47.812 OSTEOARTHRITIS OF CERVICAL SPINE, UNSPECIFIED SPINAL OSTEOARTHRITIS COMPLICATION STATUS: ICD-10-CM

## 2019-07-09 PROCEDURE — 97140 MANUAL THERAPY 1/> REGIONS: CPT

## 2019-07-09 PROCEDURE — 97014 ELECTRIC STIMULATION THERAPY: CPT

## 2019-07-09 PROCEDURE — 97110 THERAPEUTIC EXERCISES: CPT

## 2019-07-09 NOTE — OP THERAPY DAILY TREATMENT
"  Outpatient Physical Therapy  DAILY TREATMENT     [facility] Outpatient Physical Therapy [address], [address]  [place] [address]  Phone:  [phone]    Date: 07/09/2019    Patient: Radha Mcfarlane  YOB: 1960  MRN: [id number]     Time Calculation  Start time: 0230  Stop time: 0310 Time Calculation (min): 40 minutes     Chief Complaint: No chief complaint on file.    Visit #: 21    SUBJECTIVE:  Feel like neck is stiff, think it's cause I didn't sleep well last night. Overall maintaining lower symptoms.     OBJECTIVE:  Current objective measures:   \"catching\" with return extension from flexed position  Lack of AA rotation L.           Therapeutic Exercises (CPT 29426):     1. Cervical flexion w/ controlled extension    2. Cervical retraction isometric hold balloon    3. Thoracic scoops    Therapeutic Treatments and Modalities:     1. Manual Therapy (CPT 01633), cervical, upper cervical mob. improve L. AA rot; Mid cervical distraction; upper thoracic gr IV mob extension and rotation     2. E Stim Unattended (CPT 94186), cervical, IFC and MHP 15 min duration     Time-based treatments/modalities:  Manual therapy minutes (CPT 87551): 25 minutes  Therapeutic exercise minutes (CPT 79426): 15 minutes         ASSESSMENT:   Response to treatment: Focus of session was HEP independence, reducing stiffness in upper cervical and thoracic spine and improving strength and movement coordination in mid cervical spine.     PLAN/RECOMMENDATIONS:   Plan for treatment: therapy treatment to continue next visit.  Planned interventions for next visit: continue with current treatment.      "

## 2019-07-11 ENCOUNTER — PHYSICAL THERAPY (OUTPATIENT)
Dept: PHYSICAL THERAPY | Facility: REHABILITATION | Age: 59
End: 2019-07-11
Attending: INTERNAL MEDICINE
Payer: COMMERCIAL

## 2019-07-11 DIAGNOSIS — M47.812 OSTEOARTHRITIS OF CERVICAL SPINE, UNSPECIFIED SPINAL OSTEOARTHRITIS COMPLICATION STATUS: ICD-10-CM

## 2019-07-11 DIAGNOSIS — M54.2 NECK PAIN: ICD-10-CM

## 2019-07-11 PROCEDURE — 97535 SELF CARE MNGMENT TRAINING: CPT

## 2019-07-11 PROCEDURE — 97110 THERAPEUTIC EXERCISES: CPT

## 2019-07-11 PROCEDURE — 97014 ELECTRIC STIMULATION THERAPY: CPT

## 2019-07-11 NOTE — OP THERAPY DAILY TREATMENT
Outpatient Physical Therapy  DAILY TREATMENT     [facility] Outpatient Physical Therapy [address] [facility], [address]  [place] [address]  Phone:  [phone]    Date: 07/11/2019    Patient: Radha Mcfarlane  YOB: 1960  MRN: [id number]     Time Calculation  Start time: 0230  Stop time: 0325 Time Calculation (min): 55 minutes     Chief Complaint: No chief complaint on file.    Visit #: 22    SUBJECTIVE:  Feeling good today. Feeling ready for discharge.     OBJECTIVE:  Current objective measures:           Therapeutic Exercises (CPT 71075):     1. Cervical flexion w/ controlled extension    2. Cervical retraction isometric hold balloon    3. Thoracic scoops    4. Cervical rotation balloon    5. Prone I's and T's    6. MFR with ball sub-occiput    7. MFR with ball philippe-scapular     8. Thoracic extension mobilization     Therapeutic Treatments and Modalities:     1. Functional Training, Self Care (CPT 81573), cervical, thoracic, shoulder, HEP w/ pictures    2. E Stim Unattended (CPT 38103), cervical, IFC and MHP 15 min duration     Time-based treatments/modalities:  Therapeutic exercise minutes (CPT 32632): 15 minutes  Functional training, self care minutes (CPT 55036): 25 minutes         ASSESSMENT:   Response to treatment: Focus of session was HEP independence for improving thoracic mobility, mid cervical muscular coordination and strength to prevent facet locking, exercises for improving scapular postural endurance.     PLAN/RECOMMENDATIONS:   Plan for treatment: therapy treatment to continue next visit.  Planned interventions for next visit: continue with current treatment.

## 2019-08-01 ENCOUNTER — PHYSICAL THERAPY (OUTPATIENT)
Dept: PHYSICAL THERAPY | Facility: REHABILITATION | Age: 59
End: 2019-08-01
Attending: INTERNAL MEDICINE
Payer: COMMERCIAL

## 2019-08-01 DIAGNOSIS — M47.812 OSTEOARTHRITIS OF CERVICAL SPINE, UNSPECIFIED SPINAL OSTEOARTHRITIS COMPLICATION STATUS: ICD-10-CM

## 2019-08-01 DIAGNOSIS — M54.2 NECK PAIN: ICD-10-CM

## 2019-08-01 PROCEDURE — 97014 ELECTRIC STIMULATION THERAPY: CPT

## 2019-08-01 PROCEDURE — 97110 THERAPEUTIC EXERCISES: CPT

## 2019-08-01 PROCEDURE — 97140 MANUAL THERAPY 1/> REGIONS: CPT

## 2019-08-01 NOTE — OP THERAPY DAILY TREATMENT
"  Outpatient Physical Therapy  DAILY TREATMENT     [facility] Outpatient Physical Therapy [place] [address] [facility], [address]  [place] [address]  Phone:  [phone]    Date: 08/01/2019    Patient: Radha Mcfarlane  YOB: 1960  MRN: [id number]     Time Calculation  Start time: 0230  Stop time: 0315 Time Calculation (min): 45 minutes       Chief Complaint: No chief complaint on file.    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 Neck is feeling \"stable, not worsening\". I am having some isolated issues with some left sided neck tension. Making ergonomic adjustments, table that changes height.   After sustained periods of looking down having left side neck pain with extension.     OBJECTIVE:  Current objective measures:   L. Cervical pain w/ R. Cervical SB//improves to WNL and no pain    Pain with L. Cervical SB; does not resolve with manual. Suspect arthrogenic     Challenging mid cervical extension anti gravity reproduces neck pain           Therapeutic Treatments and Modalities:     1. Neuromuscular Re-education (CPT 37966), cervical, isometric stabilization orange band for extension and L. SB    2. Neuromuscular Re-education (CPT 78846), Cervical, flexion/extension cervical in quadraped    3. Neuromuscular Re-education (CPT 73740), cervical, ecc L. cervical SB for NM coordination     4. Manual Therapy (CPT 27623), Cs, L. facet gap; MFR sub occipital; MFR cervical paraspinals and SCM    5. E Stim Unattended (CPT 37336), C/s, IFC and MHP 15 min    Time-based treatments/modalities:  Manual therapy minutes (CPT 82868): 15 minutes  Therapeutic exercise minutes (CPT 75563): 15 minutes           ASSESSMENT:   Response to treatment: Patient has L. Cervical pain consistent with L. Cervical arthropathy and instability with extension. Working to improve cervical coordination and stability. Able to reduce muscular tension in neck but when challenged improving " cervical coordination for extension in aggravated neck pain on L.     PLAN/RECOMMENDATIONS:   Plan for treatment: therapy treatment to continue next visit.  Planned interventions for next visit: continue with current treatment.

## 2019-08-22 ENCOUNTER — APPOINTMENT (OUTPATIENT)
Dept: PHYSICAL THERAPY | Facility: REHABILITATION | Age: 59
End: 2019-08-22
Attending: INTERNAL MEDICINE
Payer: COMMERCIAL

## 2019-08-29 ENCOUNTER — PHYSICAL THERAPY (OUTPATIENT)
Dept: PHYSICAL THERAPY | Facility: REHABILITATION | Age: 59
End: 2019-08-29
Attending: INTERNAL MEDICINE
Payer: COMMERCIAL

## 2019-08-29 DIAGNOSIS — M47.812 OSTEOARTHRITIS OF CERVICAL SPINE, UNSPECIFIED SPINAL OSTEOARTHRITIS COMPLICATION STATUS: ICD-10-CM

## 2019-08-29 DIAGNOSIS — M54.2 NECK PAIN: ICD-10-CM

## 2019-08-29 PROCEDURE — 97014 ELECTRIC STIMULATION THERAPY: CPT

## 2019-08-29 PROCEDURE — 97535 SELF CARE MNGMENT TRAINING: CPT

## 2019-08-29 PROCEDURE — 97140 MANUAL THERAPY 1/> REGIONS: CPT

## 2019-08-29 NOTE — OP THERAPY DAILY TREATMENT
Outpatient Physical Therapy  DAILY TREATMENT     [facility] Outpatient Physical Therapy [address], [address]  [place] [address]  Phone:  [phone]    Date: 08/29/2019    Patient: Radha Mcfarlane  YOB: 1960  MRN: [id number]     Time Calculation  Start time: 0400  Stop time: 0445 Time Calculation (min): 45 minutes       Chief Complaint: No chief complaint on file.    Visit #: 24    SUBJECTIVE:  Pain with looking down. Symptoms returned now that school year has returned.     OBJECTIVE:  Current objective measures:           Therapeutic Treatments and Modalities:     1. Manual Therapy (CPT 93574), cervical, SCS posterolateral L C3 TP; Anterior L. C3     2. Manual Therapy (CPT 59963), Cervical, STW w/ fiber paraspinals and manual traction mid cervical; bilateral facet stretch C5-C3    3. E Stim Unattended (CPT 28666), cervical, IFC and MHP 15 min duration     4. Functional Training, Self Care (CPT 70386), cervical , self tx anterior L. C3 TP w/ picture     Time-based treatments/modalities:  Manual therapy minutes (CPT 57504): 22 minutes  Functional training, self care minutes (CPT 81353): 8 minutes         ASSESSMENT:   Response to treatment: Patient's chief complaint of anterior L. Cervical pain was reduced with SCS technique. Posterior lateral left c3 tender points did not achieve as good a result. Patient also has TMJ dysfunction, a right lateral deviation upon closing mouth with click. Discussed with patient bringing this up to dentist and discussing with them treatment, which could include PT.     PLAN/RECOMMENDATIONS:   Plan for treatment: therapy treatment to continue next visit.  Planned interventions for next visit: continue with current treatment.

## 2019-09-05 ENCOUNTER — PHYSICAL THERAPY (OUTPATIENT)
Dept: PHYSICAL THERAPY | Facility: REHABILITATION | Age: 59
End: 2019-09-05
Attending: INTERNAL MEDICINE
Payer: COMMERCIAL

## 2019-09-05 DIAGNOSIS — M47.812 OSTEOARTHRITIS OF CERVICAL SPINE, UNSPECIFIED SPINAL OSTEOARTHRITIS COMPLICATION STATUS: ICD-10-CM

## 2019-09-05 PROCEDURE — 97112 NEUROMUSCULAR REEDUCATION: CPT

## 2019-09-05 PROCEDURE — 97014 ELECTRIC STIMULATION THERAPY: CPT

## 2019-09-05 PROCEDURE — 97140 MANUAL THERAPY 1/> REGIONS: CPT

## 2019-09-05 NOTE — OP THERAPY DAILY TREATMENT
Outpatient Physical Therapy  DAILY TREATMENT     [facility] Outpatient Physical Therapy [address], [address]  [place] [address]  Phone:  [phone]    Date: 09/05/2019    Patient: Radha Mcfarlane  YOB: 1960  MRN: [id number]     Time Calculation  Start time: 0400  Stop time: 0445 Time Calculation (min): 45 minutes       Chief Complaint: No chief complaint on file.    Visit #: 25    SUBJECTIVE:  Neck is feeling good. After rock climbing neck was irritated. But warm bath and doing PT SCS exercise was helpful in reducing sx's.     OBJECTIVE:  Current objective measures:           Therapeutic Treatments and Modalities:     1. Neuromuscular Re-education (CPT 67541), cervical, isometric ext, rot balloon; SL isometric SB with sh. abd     2. Neuromuscular Re-education (CPT 54014), cervical, quadraped arm raise maintain neutral cervical     3. Manual Therapy (CPT 63959), Cs, cervical assessment of tone and tender points    4. E Stim Unattended (CPT 25495), C/s, IFC and MHP     Time-based treatments/modalities:  Manual therapy minutes (CPT 16689): 8 minutes  Neuromusc re-ed, balance, coor, post minutes (CPT 73917): 22 minutes         ASSESSMENT:   Response to treatment: Patient was low symptoms which allowed us to progress cervical stabilization to dynamic stabilization, tolerated well.     PLAN/RECOMMENDATIONS:   Plan for treatment: therapy treatment to continue next visit.  Planned interventions for next visit: Continue dynamic stabilization and progress as tolerated.

## 2019-09-23 ENCOUNTER — APPOINTMENT (OUTPATIENT)
Dept: PHYSICAL THERAPY | Facility: REHABILITATION | Age: 59
End: 2019-09-23
Attending: INTERNAL MEDICINE
Payer: COMMERCIAL

## 2019-10-22 ENCOUNTER — OFFICE VISIT (OUTPATIENT)
Dept: URGENT CARE | Facility: CLINIC | Age: 59
End: 2019-10-22
Payer: COMMERCIAL

## 2019-10-22 VITALS
TEMPERATURE: 99.1 F | BODY MASS INDEX: 22.54 KG/M2 | SYSTOLIC BLOOD PRESSURE: 128 MMHG | OXYGEN SATURATION: 97 % | WEIGHT: 161 LBS | HEART RATE: 74 BPM | HEIGHT: 71 IN | RESPIRATION RATE: 18 BRPM | DIASTOLIC BLOOD PRESSURE: 78 MMHG

## 2019-10-22 DIAGNOSIS — R50.9 LOW GRADE FEVER: ICD-10-CM

## 2019-10-22 DIAGNOSIS — J34.89 STUFFY AND RUNNY NOSE: ICD-10-CM

## 2019-10-22 DIAGNOSIS — R05.9 COUGH IN ADULT PATIENT: ICD-10-CM

## 2019-10-22 DIAGNOSIS — J06.9 VIRAL URI WITH COUGH: ICD-10-CM

## 2019-10-22 DIAGNOSIS — J02.9 SORE THROAT: ICD-10-CM

## 2019-10-22 PROCEDURE — 99213 OFFICE O/P EST LOW 20 MIN: CPT | Performed by: NURSE PRACTITIONER

## 2019-10-22 PROCEDURE — 87804 INFLUENZA ASSAY W/OPTIC: CPT | Performed by: NURSE PRACTITIONER

## 2019-10-22 PROCEDURE — 87880 STREP A ASSAY W/OPTIC: CPT | Performed by: NURSE PRACTITIONER

## 2019-10-22 RX ORDER — MOMETASONE FUROATE 50 UG/1
2 SPRAY, METERED NASAL DAILY
COMMUNITY

## 2019-10-22 RX ORDER — ALBUTEROL SULFATE 90 UG/1
2 AEROSOL, METERED RESPIRATORY (INHALATION) 4 TIMES DAILY
Qty: 8.5 G | Refills: 0 | Status: SHIPPED | OUTPATIENT
Start: 2019-10-22

## 2019-10-22 ASSESSMENT — ENCOUNTER SYMPTOMS
SPUTUM PRODUCTION: 1
DIARRHEA: 0
FEVER: 0
CHILLS: 0
SHORTNESS OF BREATH: 0
ORTHOPNEA: 0
COUGH: 1
HEADACHES: 0
NAUSEA: 0
EYE DISCHARGE: 0
MYALGIAS: 0
WHEEZING: 0
SORE THROAT: 1

## 2019-10-23 ENCOUNTER — IMMUNIZATION (OUTPATIENT)
Dept: SOCIAL WORK | Facility: CLINIC | Age: 59
End: 2019-10-23
Payer: COMMERCIAL

## 2019-10-23 DIAGNOSIS — Z23 NEED FOR VACCINATION: ICD-10-CM

## 2019-10-23 PROCEDURE — 90471 IMMUNIZATION ADMIN: CPT | Performed by: REGISTERED NURSE

## 2019-10-23 PROCEDURE — 90686 IIV4 VACC NO PRSV 0.5 ML IM: CPT | Performed by: REGISTERED NURSE

## 2019-10-23 NOTE — PROGRESS NOTES
Subjective:      Radha Mcfarlane is a 59 y.o. female who presents with URI (runny nose, post nasal drip with scratchy throat and tickle kind of cough x 3 days)            HPI New. 59 year old female with runny nose, post nasal drip and scratchy throat with cough for 3 days. She denies fever, chills, myalgia, nausea or diarrhea. She does work in [employer] and is concerned about pertussis however she is up to date on tdap (2013). She has been taking otc medications for this. No flu shot yet.  Patient has no known allergies.  Current Outpatient Medications on File Prior to Visit   Medication Sig Dispense Refill   • loratadine (CLARITIN) 10 MG Tab Take 10 mg by mouth every day.     • Diclofenac Sodium 1 % Gel Apply 1 g to skin as directed 3 times a day as needed. 1 Tube 3   • ibuprofen (MOTRIN) 200 MG Tab 1-3 PO Q6H PRN pain     • acetaminophen (TYLENOL) 325 MG TABS Take 650 mg by mouth Once. Prior to Iron Dextran infusion     • mometasone (NASONEX) 50 MCG/ACT nasal spray Spray 2 Sprays in nose every day.     • cyclobenzaprine (FLEXERIL) 5 MG tablet Take 1-2 Tabs by mouth 3 times a day as needed. (Patient not taking: Reported on 10/22/2019) 30 Tab 0     No current facility-administered medications on file prior to visit.      Social History     Socioeconomic History   • Marital status:      Spouse name: Not on file   • Number of children: Not on file   • Years of education: Not on file   • Highest education level: Not on file   Occupational History   • Not on file   Social Needs   • Financial resource strain: Not on file   • Food insecurity:     Worry: Not on file     Inability: Not on file   • Transportation needs:     Medical: Not on file     Non-medical: Not on file   Tobacco Use   • Smoking status: Never Smoker   • Smokeless tobacco: Never Used   Substance and Sexual Activity   • Alcohol use: Yes     Alcohol/week: 3.6 oz     Types: 6 Glasses of wine per week   • Drug use: No   • Sexual  "activity: Not on file   Lifestyle   • Physical activity:     Days per week: Not on file     Minutes per session: Not on file   • Stress: Not on file   Relationships   • Social connections:     Talks on phone: Not on file     Gets together: Not on file     Attends Latter day service: Not on file     Active member of club or organization: Not on file     Attends meetings of clubs or organizations: Not on file     Relationship status: Not on file   • Intimate partner violence:     Fear of current or ex partner: Not on file     Emotionally abused: Not on file     Physically abused: Not on file     Forced sexual activity: Not on file   Other Topics Concern   • Not on file   Social History Narrative   • Not on file     Breast Cancer-related family history is not on file.      Review of Systems   Constitutional: Positive for malaise/fatigue. Negative for chills and fever.   HENT: Positive for congestion and sore throat.    Eyes: Negative for discharge.   Respiratory: Positive for cough and sputum production. Negative for shortness of breath and wheezing.    Cardiovascular: Negative for chest pain and orthopnea.   Gastrointestinal: Negative for diarrhea and nausea.   Musculoskeletal: Negative for myalgias.   Neurological: Negative for headaches.   Endo/Heme/Allergies: Negative for environmental allergies.          Objective:     /78 (BP Location: Left arm, Patient Position: Sitting, BP Cuff Size: Adult)   Pulse 74   Temp 37.3 °C (99.1 °F) (Temporal)   Resp 18   Ht 1.803 m (5' 11\")   Wt 73 kg (161 lb)   SpO2 97%   BMI 22.45 kg/m²      Physical Exam   Constitutional: She is oriented to person, place, and time. She appears well-developed and well-nourished. No distress.   HENT:   Head: Normocephalic and atraumatic.   Right Ear: External ear and ear canal normal. Tympanic membrane is not injected and not perforated. No middle ear effusion.   Left Ear: External ear and ear canal normal. Tympanic membrane is not " injected and not perforated.  No middle ear effusion.   Nose: Mucosal edema present.   Mouth/Throat: Posterior oropharyngeal erythema present. No oropharyngeal exudate.   Eyes: Conjunctivae are normal. Right eye exhibits no discharge. Left eye exhibits no discharge.   Neck: Normal range of motion. Neck supple.   Cardiovascular: Normal rate, regular rhythm and normal heart sounds.   No murmur heard.  Pulmonary/Chest: Effort normal and breath sounds normal. No respiratory distress.   Musculoskeletal: Normal range of motion.   Normal movement of all 4 extremities.   Lymphadenopathy:     She has no cervical adenopathy.        Right: No supraclavicular adenopathy present.        Left: No supraclavicular adenopathy present.   Neurological: She is alert and oriented to person, place, and time. Gait normal.   Skin: Skin is warm and dry.   Psychiatric: She has a normal mood and affect. Her behavior is normal. Thought content normal.   Nursing note and vitals reviewed.              Assessment/Plan:     1. Viral URI with cough     2. Sore throat  POCT Rapid Strep A    POCT Influenza A/B   3. Cough in adult patient  POCT Rapid Strep A    POCT Influenza A/B    albuterol (PROVENTIL HFA) 108 (90 Base) MCG/ACT Aero Soln inhalation aerosol   4. Stuffy and runny nose  POCT Rapid Strep A    POCT Influenza A/B   5. Low grade fever  POCT Rapid Strep A    POCT Influenza A/B     Flu and strep negative.  Viral illness at this time with no indication for antibiotics. Reviewed with patient expected course of illness and also reviewed OTC medications that may be used for symptom relief. Follow up 7-10 days if not improving.

## 2019-12-02 ENCOUNTER — APPOINTMENT (RX ONLY)
Dept: URBAN - METROPOLITAN AREA CLINIC 35 | Facility: CLINIC | Age: 59
Setting detail: DERMATOLOGY
End: 2019-12-02

## 2019-12-02 DIAGNOSIS — L72.8 OTHER FOLLICULAR CYSTS OF THE SKIN AND SUBCUTANEOUS TISSUE: ICD-10-CM

## 2019-12-02 DIAGNOSIS — L81.4 OTHER MELANIN HYPERPIGMENTATION: ICD-10-CM

## 2019-12-02 DIAGNOSIS — Z85.828 PERSONAL HISTORY OF OTHER MALIGNANT NEOPLASM OF SKIN: ICD-10-CM

## 2019-12-02 DIAGNOSIS — L82.1 OTHER SEBORRHEIC KERATOSIS: ICD-10-CM

## 2019-12-02 DIAGNOSIS — Z71.89 OTHER SPECIFIED COUNSELING: ICD-10-CM

## 2019-12-02 PROBLEM — D23.72 OTHER BENIGN NEOPLASM OF SKIN OF LEFT LOWER LIMB, INCLUDING HIP: Status: ACTIVE | Noted: 2019-12-02

## 2019-12-02 PROBLEM — D23.71 OTHER BENIGN NEOPLASM OF SKIN OF RIGHT LOWER LIMB, INCLUDING HIP: Status: ACTIVE | Noted: 2019-12-02

## 2019-12-02 PROCEDURE — 99214 OFFICE O/P EST MOD 30 MIN: CPT

## 2019-12-02 PROCEDURE — ? COUNSELING

## 2019-12-02 ASSESSMENT — LOCATION ZONE DERM
LOCATION ZONE: TRUNK
LOCATION ZONE: SCALP
LOCATION ZONE: EAR
LOCATION ZONE: ARM

## 2019-12-02 ASSESSMENT — LOCATION DETAILED DESCRIPTION DERM
LOCATION DETAILED: EPIGASTRIC SKIN
LOCATION DETAILED: RIGHT MID-UPPER BACK
LOCATION DETAILED: MID-FRONTAL SCALP
LOCATION DETAILED: RIGHT PROXIMAL DORSAL FOREARM
LOCATION DETAILED: RIGHT TRAGUS

## 2019-12-02 ASSESSMENT — LOCATION SIMPLE DESCRIPTION DERM
LOCATION SIMPLE: ABDOMEN
LOCATION SIMPLE: RIGHT FOREARM
LOCATION SIMPLE: ANTERIOR SCALP
LOCATION SIMPLE: RIGHT UPPER BACK
LOCATION SIMPLE: RIGHT EAR

## 2019-12-02 NOTE — PROCEDURE: COUNSELING
Detail Level: Detailed
Detail Level: Generalized
Patient Specific Counseling (Will Not Stick From Patient To Patient): Save Bx was done of DF. Lesion growing and irritated per pt. Advise excision. Pt will consider.

## 2020-01-08 NOTE — OP THERAPY DISCHARGE SUMMARY
Outpatient Physical Therapy  DISCHARGE SUMMARY NOTE      [facility] Outpatient Physical Therapy [place] [address] [facility], [address]  [place] [address]  Phone:  [phone]    Date of Visit: 09/05/2019    Patient: Radha Mcfarlane  YOB: 1960  MRN: [id number]     Referring Provider: Ming Shaikh M.D.  [address] [place] [address]   Referring Diagnosis Cervicalgia [M54.2];Spondylosis without myelopathy or radiculopathy, cervical region [M47.812]     Physical Therapy Occurrence Codes    Date of onset of impairment:  3/27/19   Date physical therapy care plan established or reviewed:  4/18/19   Date physical therapy treatment started:  4/18/19          Functional Assessment Used        Your patient is being discharged from Physical Therapy with the following comments:   · Goals partially met    Comments:  Radha attended PT for 25 visits over the course of 5 months. Her chief complaint was cervical stiffness and intermittent pain without radiculopathy. Symptoms were exacerbated by sustained periods of looking down, as an  she did that frequently.   Her symptoms were reduced with PT and she learned ways of managing neck pain in the future through HEP. She did not achieve 100% resolution as symptoms continued to wax and wane depending on her activity.      Limitations Remaining:  Mid cervical hypermobility  Upper cervical hypomobility  Upper thoracic hypomobility  Reduced endurance thoracic paraspinals  Scapular dyskinesia   Recommendations:  Continue with HEP    Sigifredo Owusu, PT    Date: 1/8/2020

## 2020-06-16 ENCOUNTER — APPOINTMENT (RX ONLY)
Dept: URBAN - METROPOLITAN AREA CLINIC 35 | Facility: CLINIC | Age: 60
Setting detail: DERMATOLOGY
End: 2020-06-16

## 2020-06-16 DIAGNOSIS — L82.1 OTHER SEBORRHEIC KERATOSIS: ICD-10-CM

## 2020-06-16 DIAGNOSIS — L72.8 OTHER FOLLICULAR CYSTS OF THE SKIN AND SUBCUTANEOUS TISSUE: ICD-10-CM

## 2020-06-16 DIAGNOSIS — Z85.828 PERSONAL HISTORY OF OTHER MALIGNANT NEOPLASM OF SKIN: ICD-10-CM

## 2020-06-16 DIAGNOSIS — L81.4 OTHER MELANIN HYPERPIGMENTATION: ICD-10-CM

## 2020-06-16 DIAGNOSIS — Z71.89 OTHER SPECIFIED COUNSELING: ICD-10-CM

## 2020-06-16 DIAGNOSIS — D22 MELANOCYTIC NEVI: ICD-10-CM

## 2020-06-16 PROBLEM — D23.71 OTHER BENIGN NEOPLASM OF SKIN OF RIGHT LOWER LIMB, INCLUDING HIP: Status: ACTIVE | Noted: 2020-06-16

## 2020-06-16 PROBLEM — D22.5 MELANOCYTIC NEVI OF TRUNK: Status: ACTIVE | Noted: 2020-06-16

## 2020-06-16 PROBLEM — B07.9 VIRAL WART, UNSPECIFIED: Status: ACTIVE | Noted: 2020-06-16

## 2020-06-16 PROBLEM — D23.72 OTHER BENIGN NEOPLASM OF SKIN OF LEFT LOWER LIMB, INCLUDING HIP: Status: ACTIVE | Noted: 2020-06-16

## 2020-06-16 PROCEDURE — 99214 OFFICE O/P EST MOD 30 MIN: CPT | Mod: 25

## 2020-06-16 PROCEDURE — 17110 DESTRUCTION B9 LES UP TO 14: CPT

## 2020-06-16 PROCEDURE — ? TREATMENT REGIMEN

## 2020-06-16 PROCEDURE — ? BENIGN DESTRUCTION

## 2020-06-16 PROCEDURE — ? COUNSELING

## 2020-06-16 ASSESSMENT — LOCATION DETAILED DESCRIPTION DERM
LOCATION DETAILED: RIGHT LATERAL SUPERIOR CHEST
LOCATION DETAILED: RIGHT MID-UPPER BACK
LOCATION DETAILED: RIGHT TRAGUS
LOCATION DETAILED: RIGHT PROXIMAL DORSAL FOREARM
LOCATION DETAILED: RIGHT SUPERIOR UPPER BACK
LOCATION DETAILED: LEFT MEDIAL UPPER BACK
LOCATION DETAILED: RIGHT MEDIAL FRONTAL SCALP

## 2020-06-16 ASSESSMENT — LOCATION SIMPLE DESCRIPTION DERM
LOCATION SIMPLE: CHEST
LOCATION SIMPLE: RIGHT FOREARM
LOCATION SIMPLE: RIGHT SCALP
LOCATION SIMPLE: RIGHT UPPER BACK
LOCATION SIMPLE: LEFT UPPER BACK
LOCATION SIMPLE: RIGHT EAR

## 2020-06-16 ASSESSMENT — LOCATION ZONE DERM
LOCATION ZONE: EAR
LOCATION ZONE: SCALP
LOCATION ZONE: TRUNK
LOCATION ZONE: ARM

## 2020-06-16 NOTE — HPI: SKIN LESION
Is This A New Presentation, Or A Follow-Up?: Skin Lesion
How Severe Is Your Skin Lesion?: moderate
Has Your Skin Lesion Been Treated?: been treated
Has Your Skin Lesion Been Treated?: not been treated

## 2020-06-16 NOTE — PROCEDURE: COUNSELING
Detail Level: Detailed
Detail Level: Generalized
Patient Specific Counseling (Will Not Stick From Patient To Patient): Save Bx was done of DF. Lesion growing and irritated per pt. Advise excision. Pt will consider.
limitations in strength

## 2020-06-16 NOTE — PROCEDURE: BENIGN DESTRUCTION
Render Note In Bullet Format When Appropriate: No
Consent: The patient's consent was obtained including but not limited to risks of crusting, scabbing, blistering, scarring, darker or lighter pigmentary change, recurrence, incomplete removal and infection.
Include Z78.9 (Other Specified Conditions Influencing Health Status) As An Associated Diagnosis?: Yes
Treatment Number (Will Not Render If 0): 0
Medical Necessity Clause: This procedure was medically necessary because the lesions that were treated were:
Medical Necessity Information: It is in your best interest to select a reason for this procedure from the list below. All of these items fulfill various CMS LCD requirements except the new and changing color options.
Detail Level: Detailed
Post-Care Instructions: I reviewed with the patient in detail post-care instructions. Patient is to wear sunprotection, and avoid picking at any of the treated lesions. Pt may apply Vaseline to crusted or scabbing areas.\\nCantharidin to be washed off in four hours

## 2021-02-05 ENCOUNTER — HOSPITAL ENCOUNTER (OUTPATIENT)
Dept: RADIOLOGY | Facility: MEDICAL CENTER | Age: 61
End: 2021-02-05
Attending: INTERNAL MEDICINE
Payer: COMMERCIAL

## 2021-02-05 DIAGNOSIS — Z12.31 VISIT FOR SCREENING MAMMOGRAM: ICD-10-CM

## 2021-02-05 PROCEDURE — 77063 BREAST TOMOSYNTHESIS BI: CPT

## 2021-04-14 ENCOUNTER — HOSPITAL ENCOUNTER (OUTPATIENT)
Dept: LAB | Facility: MEDICAL CENTER | Age: 61
End: 2021-04-14
Attending: STUDENT IN AN ORGANIZED HEALTH CARE EDUCATION/TRAINING PROGRAM
Payer: COMMERCIAL

## 2021-04-14 ENCOUNTER — HOSPITAL ENCOUNTER (OUTPATIENT)
Dept: PULMONOLOGY | Facility: MEDICAL CENTER | Age: 61
End: 2021-04-14
Attending: STUDENT IN AN ORGANIZED HEALTH CARE EDUCATION/TRAINING PROGRAM
Payer: COMMERCIAL

## 2021-04-14 LAB
ALBUMIN SERPL BCP-MCNC: 4.4 G/DL (ref 3.2–4.9)
ALBUMIN/GLOB SERPL: 1.8 G/DL
ALP SERPL-CCNC: 68 U/L (ref 30–99)
ALT SERPL-CCNC: 14 U/L (ref 2–50)
ANION GAP SERPL CALC-SCNC: 8 MMOL/L (ref 7–16)
AST SERPL-CCNC: 16 U/L (ref 12–45)
BASOPHILS # BLD AUTO: 1.3 % (ref 0–1.8)
BASOPHILS # BLD: 0.05 K/UL (ref 0–0.12)
BILIRUB SERPL-MCNC: 0.5 MG/DL (ref 0.1–1.5)
BUN SERPL-MCNC: 13 MG/DL (ref 8–22)
CALCIUM SERPL-MCNC: 9.5 MG/DL (ref 8.5–10.5)
CHLORIDE SERPL-SCNC: 104 MMOL/L (ref 96–112)
CHOLEST SERPL-MCNC: 242 MG/DL (ref 100–199)
CO2 SERPL-SCNC: 26 MMOL/L (ref 20–33)
CREAT SERPL-MCNC: 0.76 MG/DL (ref 0.5–1.4)
EOSINOPHIL # BLD AUTO: 0.18 K/UL (ref 0–0.51)
EOSINOPHIL NFR BLD: 4.8 % (ref 0–6.9)
ERYTHROCYTE [DISTWIDTH] IN BLOOD BY AUTOMATED COUNT: 42.9 FL (ref 35.9–50)
GLOBULIN SER CALC-MCNC: 2.4 G/DL (ref 1.9–3.5)
GLUCOSE SERPL-MCNC: 98 MG/DL (ref 65–99)
HCT VFR BLD AUTO: 45.6 % (ref 37–47)
HDLC SERPL-MCNC: 76 MG/DL
HGB BLD-MCNC: 14.9 G/DL (ref 12–16)
IMM GRANULOCYTES # BLD AUTO: 0 K/UL (ref 0–0.11)
IMM GRANULOCYTES NFR BLD AUTO: 0 % (ref 0–0.9)
LDLC SERPL CALC-MCNC: 154 MG/DL
LYMPHOCYTES # BLD AUTO: 1.62 K/UL (ref 1–4.8)
LYMPHOCYTES NFR BLD: 43.4 % (ref 22–41)
MCH RBC QN AUTO: 30.4 PG (ref 27–33)
MCHC RBC AUTO-ENTMCNC: 32.7 G/DL (ref 33.6–35)
MCV RBC AUTO: 93.1 FL (ref 81.4–97.8)
MONOCYTES # BLD AUTO: 0.39 K/UL (ref 0–0.85)
MONOCYTES NFR BLD AUTO: 10.5 % (ref 0–13.4)
NEUTROPHILS # BLD AUTO: 1.49 K/UL (ref 2–7.15)
NEUTROPHILS NFR BLD: 40 % (ref 44–72)
NRBC # BLD AUTO: 0 K/UL
NRBC BLD-RTO: 0 /100 WBC
PLATELET # BLD AUTO: 224 K/UL (ref 164–446)
PMV BLD AUTO: 10.8 FL (ref 9–12.9)
POTASSIUM SERPL-SCNC: 4.3 MMOL/L (ref 3.6–5.5)
PROT SERPL-MCNC: 6.8 G/DL (ref 6–8.2)
RBC # BLD AUTO: 4.9 M/UL (ref 4.2–5.4)
SODIUM SERPL-SCNC: 138 MMOL/L (ref 135–145)
TRIGL SERPL-MCNC: 59 MG/DL (ref 0–149)
WBC # BLD AUTO: 3.7 K/UL (ref 4.8–10.8)

## 2021-04-14 PROCEDURE — 80061 LIPID PANEL: CPT

## 2021-04-14 PROCEDURE — 94060 EVALUATION OF WHEEZING: CPT

## 2021-04-14 PROCEDURE — 94726 PLETHYSMOGRAPHY LUNG VOLUMES: CPT

## 2021-04-14 PROCEDURE — 94729 DIFFUSING CAPACITY: CPT

## 2021-04-14 PROCEDURE — 80053 COMPREHEN METABOLIC PANEL: CPT

## 2021-04-14 PROCEDURE — 36415 COLL VENOUS BLD VENIPUNCTURE: CPT

## 2021-04-14 PROCEDURE — 85025 COMPLETE CBC W/AUTO DIFF WBC: CPT

## 2021-04-14 RX ORDER — ALBUTEROL SULFATE 90 UG/1
2 AEROSOL, METERED RESPIRATORY (INHALATION)
Status: DISCONTINUED | OUTPATIENT
Start: 2021-04-14 | End: 2021-04-15 | Stop reason: HOSPADM

## 2021-04-14 ASSESSMENT — PULMONARY FUNCTION TESTS
FEV1/FVC: 76.09
FEV1_LLN: 2.60
FEV1/FVC_PERCENT_CHANGE: 21
FEV1/FVC: 62.68
FEV1/FVC_PERCENT_PREDICTED: 98
FVC_LLN: 3.35
FEV1: 2.64
FEV1/FVC_PERCENT_PREDICTED: 79
FEV1/FVC: 63
FVC: 4.35
FEV1_PERCENT_PREDICTED: 84
FEV1/FVC_PREDICTED: 78.46
FEV1_PREDICTED: 3.11
FEV1/FVC_PERCENT_LLN: 65.51
FEV1/FVC_PERCENT_LLN: 65.51
FEV1/FVC_PERCENT_PREDICTED: 96
FEV1_PERCENT_CHANGE: 25
FVC: 4.21
FVC_LLN: 3.35
FVC_PERCENT_PREDICTED: 105
FVC_PREDICTED: 4.01
FEV1/FVC_PERCENT_PREDICTED: 80
FEV1/FVC: 76.01
FEV1_PERCENT_PREDICTED: 106
FVC_PERCENT_PREDICTED: 108
FEV1/FVC_PERCENT_CHANGE: 833
FEV1_PERCENT_CHANGE: 3
FEV1/FVC_PERCENT_PREDICTED: 78
FEV1_LLN: 2.60
FEV1: 3.31

## 2021-04-16 PROCEDURE — 94060 EVALUATION OF WHEEZING: CPT | Mod: 26 | Performed by: INTERNAL MEDICINE

## 2021-04-16 PROCEDURE — 94729 DIFFUSING CAPACITY: CPT | Mod: 26 | Performed by: INTERNAL MEDICINE

## 2021-04-16 PROCEDURE — 94726 PLETHYSMOGRAPHY LUNG VOLUMES: CPT | Mod: 26 | Performed by: INTERNAL MEDICINE

## 2021-04-16 NOTE — PROCEDURES
DATE OF SERVICE:  04/14/2021     PULMONARY FUNCTION TEST INTERPRETATION    This exam is performed with a primary diagnosis of asthma to help establish a pre-test probability of the patient’s diagnostic findings.    The Flow Volume Loop is of sufficient quality and the volume-time graph demonstrates an adequate exhalation to provide a confident interpretation.    The FEV1/FVC ratio pre bronchodilator is 0.62 and post bronchodilator 0.76 indicating reversible airflow obstruction. This is accompanied by a normal FEV1 and FVC based on predicted values. After administration of 2 puffs of albuterol, the patient did achieve a significant bronchodilator response (12% and 200 ml in FEV1).     Lung volumes are within the limits of predicted values effectively ruling out restrictive lung disease. The DLCO is significantly elevated (> 120% predicted).     Impression:    1. Mild reversible airflow obstruction with positive bronchodilator response, supportive of the diagnosis of asthma.   2. The DLCO is significantly elevated which can be seen with polycythemia, left-to-right shunts, supine positing, exercise, asthma. Clinical correlation recommended.      ______________________________  MD RAJ Sanchez/ADEEL/ADA    DD:  04/15/2021 15:53  DT:  04/15/2021 17:53    Job#:  [id number]

## 2021-06-01 ENCOUNTER — APPOINTMENT (RX ONLY)
Dept: URBAN - METROPOLITAN AREA CLINIC 35 | Facility: CLINIC | Age: 61
Setting detail: DERMATOLOGY
End: 2021-06-01

## 2021-06-01 DIAGNOSIS — L82.1 OTHER SEBORRHEIC KERATOSIS: ICD-10-CM

## 2021-06-01 DIAGNOSIS — D22 MELANOCYTIC NEVI: ICD-10-CM

## 2021-06-01 DIAGNOSIS — L81.4 OTHER MELANIN HYPERPIGMENTATION: ICD-10-CM

## 2021-06-01 DIAGNOSIS — Z85.828 PERSONAL HISTORY OF OTHER MALIGNANT NEOPLASM OF SKIN: ICD-10-CM

## 2021-06-01 DIAGNOSIS — B07.8 OTHER VIRAL WARTS: ICD-10-CM

## 2021-06-01 DIAGNOSIS — Z71.89 OTHER SPECIFIED COUNSELING: ICD-10-CM

## 2021-06-01 DIAGNOSIS — L72.8 OTHER FOLLICULAR CYSTS OF THE SKIN AND SUBCUTANEOUS TISSUE: ICD-10-CM

## 2021-06-01 PROBLEM — D22.5 MELANOCYTIC NEVI OF TRUNK: Status: ACTIVE | Noted: 2021-06-01

## 2021-06-01 PROBLEM — D23.71 OTHER BENIGN NEOPLASM OF SKIN OF RIGHT LOWER LIMB, INCLUDING HIP: Status: ACTIVE | Noted: 2021-06-01

## 2021-06-01 PROBLEM — D23.72 OTHER BENIGN NEOPLASM OF SKIN OF LEFT LOWER LIMB, INCLUDING HIP: Status: ACTIVE | Noted: 2021-06-01

## 2021-06-01 PROCEDURE — ? COUNSELING

## 2021-06-01 PROCEDURE — ? SUNSCREEN RECOMMENDATIONS

## 2021-06-01 PROCEDURE — ? BENIGN DESTRUCTION

## 2021-06-01 PROCEDURE — 17110 DESTRUCTION B9 LES UP TO 14: CPT

## 2021-06-01 PROCEDURE — 99213 OFFICE O/P EST LOW 20 MIN: CPT | Mod: 25

## 2021-06-01 PROCEDURE — ? OBSERVATION AND MEASURE

## 2021-06-01 ASSESSMENT — LOCATION ZONE DERM
LOCATION ZONE: SCALP
LOCATION ZONE: TRUNK
LOCATION ZONE: ARM
LOCATION ZONE: FINGER
LOCATION ZONE: EAR

## 2021-06-01 ASSESSMENT — LOCATION DETAILED DESCRIPTION DERM
LOCATION DETAILED: RIGHT TRAGUS
LOCATION DETAILED: RIGHT MID-UPPER BACK
LOCATION DETAILED: RIGHT LATERAL SUPERIOR CHEST
LOCATION DETAILED: RIGHT DISTAL PALMAR INDEX FINGER
LOCATION DETAILED: RIGHT PROXIMAL DORSAL FOREARM
LOCATION DETAILED: RIGHT SUPERIOR UPPER BACK
LOCATION DETAILED: RIGHT MEDIAL FRONTAL SCALP
LOCATION DETAILED: LEFT MEDIAL UPPER BACK

## 2021-06-01 ASSESSMENT — LOCATION SIMPLE DESCRIPTION DERM
LOCATION SIMPLE: LEFT UPPER BACK
LOCATION SIMPLE: RIGHT FOREARM
LOCATION SIMPLE: CHEST
LOCATION SIMPLE: RIGHT INDEX FINGER
LOCATION SIMPLE: RIGHT EAR
LOCATION SIMPLE: RIGHT SCALP
LOCATION SIMPLE: RIGHT UPPER BACK

## 2021-06-01 NOTE — PROCEDURE: BENIGN DESTRUCTION
Add 52 Modifier (Optional): no
Treatment Number (Will Not Render If 0): 0
Medical Necessity Information: It is in your best interest to select a reason for this procedure from the list below. All of these items fulfill various CMS LCD requirements except the new and changing color options.
Consent: The patient's consent was obtained including but not limited to risks of crusting, scabbing, blistering, scarring, darker or lighter pigmentary change, recurrence, incomplete removal and infection.
Medical Necessity Clause: This procedure was medically necessary because the lesions that were treated were:
Detail Level: Detailed
Post-Care Instructions: I reviewed with the patient in detail post-care instructions. Patient is to wear sunprotection, and avoid picking at any of the treated lesions. Pt may apply Vaseline to crusted or scabbing areas.\\nCantharidin to be washed off in four hours
Include Z78.9 (Other Specified Conditions Influencing Health Status) As An Associated Diagnosis?: Yes

## 2021-06-01 NOTE — PROCEDURE: OBSERVATION
Detail Level: Detailed
Size Of Lesion: 1 cm
Morphology Per Location (Optional): firm violaceous nodule, sometimes painful, pathology proven Dermatofibroma

## 2021-09-09 ENCOUNTER — HOSPITAL ENCOUNTER (OUTPATIENT)
Dept: LAB | Facility: MEDICAL CENTER | Age: 61
End: 2021-09-09
Attending: PHYSICIAN ASSISTANT
Payer: COMMERCIAL

## 2021-09-09 PROCEDURE — 88175 CYTOPATH C/V AUTO FLUID REDO: CPT

## 2021-09-10 LAB — CYTOLOGY REG CYTOL: NORMAL

## 2021-11-30 ENCOUNTER — APPOINTMENT (RX ONLY)
Dept: URBAN - METROPOLITAN AREA CLINIC 35 | Facility: CLINIC | Age: 61
Setting detail: DERMATOLOGY
End: 2021-11-30

## 2021-11-30 DIAGNOSIS — L72.8 OTHER FOLLICULAR CYSTS OF THE SKIN AND SUBCUTANEOUS TISSUE: ICD-10-CM

## 2021-11-30 DIAGNOSIS — L82.1 OTHER SEBORRHEIC KERATOSIS: ICD-10-CM

## 2021-11-30 DIAGNOSIS — D18.0 HEMANGIOMA: ICD-10-CM

## 2021-11-30 DIAGNOSIS — L81.4 OTHER MELANIN HYPERPIGMENTATION: ICD-10-CM

## 2021-11-30 DIAGNOSIS — L20.89 OTHER ATOPIC DERMATITIS: ICD-10-CM

## 2021-11-30 DIAGNOSIS — L57.0 ACTINIC KERATOSIS: ICD-10-CM

## 2021-11-30 DIAGNOSIS — Z85.828 PERSONAL HISTORY OF OTHER MALIGNANT NEOPLASM OF SKIN: ICD-10-CM

## 2021-11-30 DIAGNOSIS — D22 MELANOCYTIC NEVI: ICD-10-CM

## 2021-11-30 DIAGNOSIS — Z71.89 OTHER SPECIFIED COUNSELING: ICD-10-CM

## 2021-11-30 PROBLEM — D22.5 MELANOCYTIC NEVI OF TRUNK: Status: ACTIVE | Noted: 2021-11-30

## 2021-11-30 PROBLEM — L20.84 INTRINSIC (ALLERGIC) ECZEMA: Status: ACTIVE | Noted: 2021-11-30

## 2021-11-30 PROBLEM — D23.72 OTHER BENIGN NEOPLASM OF SKIN OF LEFT LOWER LIMB, INCLUDING HIP: Status: ACTIVE | Noted: 2021-11-30

## 2021-11-30 PROBLEM — D23.71 OTHER BENIGN NEOPLASM OF SKIN OF RIGHT LOWER LIMB, INCLUDING HIP: Status: ACTIVE | Noted: 2021-11-30

## 2021-11-30 PROBLEM — D18.01 HEMANGIOMA OF SKIN AND SUBCUTANEOUS TISSUE: Status: ACTIVE | Noted: 2021-11-30

## 2021-11-30 PROCEDURE — ? COUNSELING

## 2021-11-30 PROCEDURE — ? LIQUID NITROGEN

## 2021-11-30 PROCEDURE — ? OBSERVATION AND MEASURE

## 2021-11-30 PROCEDURE — 17000 DESTRUCT PREMALG LESION: CPT

## 2021-11-30 PROCEDURE — ? PRESCRIPTION

## 2021-11-30 PROCEDURE — ? SUNSCREEN RECOMMENDATIONS

## 2021-11-30 PROCEDURE — 99213 OFFICE O/P EST LOW 20 MIN: CPT | Mod: 25

## 2021-11-30 RX ORDER — TRIAMCINOLONE ACETONIDE 1 MG/G
1 CREAM TOPICAL TWICE A DAY
Qty: 80 | Refills: 3 | Status: ERX

## 2021-11-30 ASSESSMENT — LOCATION DETAILED DESCRIPTION DERM
LOCATION DETAILED: LEFT PROXIMAL DORSAL FOREARM
LOCATION DETAILED: RIGHT LATERAL SUPERIOR CHEST
LOCATION DETAILED: RIGHT SUPERIOR UPPER BACK
LOCATION DETAILED: RIGHT MEDIAL FRONTAL SCALP
LOCATION DETAILED: RIGHT SUPERIOR LATERAL UPPER BACK
LOCATION DETAILED: RIGHT TRAGUS
LOCATION DETAILED: RIGHT PROXIMAL DORSAL FOREARM
LOCATION DETAILED: LEFT MEDIAL UPPER BACK
LOCATION DETAILED: RIGHT UPPER CUTANEOUS LIP

## 2021-11-30 ASSESSMENT — LOCATION ZONE DERM
LOCATION ZONE: ARM
LOCATION ZONE: EAR
LOCATION ZONE: SCALP
LOCATION ZONE: LIP
LOCATION ZONE: TRUNK

## 2021-11-30 ASSESSMENT — LOCATION SIMPLE DESCRIPTION DERM
LOCATION SIMPLE: RIGHT LIP
LOCATION SIMPLE: RIGHT FOREARM
LOCATION SIMPLE: LEFT FOREARM
LOCATION SIMPLE: LEFT UPPER BACK
LOCATION SIMPLE: RIGHT BACK
LOCATION SIMPLE: RIGHT EAR
LOCATION SIMPLE: CHEST
LOCATION SIMPLE: RIGHT UPPER BACK
LOCATION SIMPLE: RIGHT SCALP

## 2021-11-30 NOTE — PROCEDURE: LIQUID NITROGEN
Duration Of Freeze Thaw-Cycle (Seconds): 2
Show Applicator Variable?: Yes
Consent: The patient's consent was obtained including but not limited to risks of crusting, scabbing, blistering, scarring, darker or lighter pigmentary change, recurrence, incomplete removal and infection.
Render Post-Care Instructions In Note?: no
Post-Care Instructions: I reviewed with the patient in detail post-care instructions. Patient is to wear sunprotection, and avoid picking at any of the treated lesions. Pt may apply Vaseline to crusted or scabbing areas.
Detail Level: Detailed
Number Of Freeze-Thaw Cycles: 2 freeze-thaw cycles

## 2022-03-22 ENCOUNTER — HOSPITAL ENCOUNTER (OUTPATIENT)
Dept: RADIOLOGY | Facility: MEDICAL CENTER | Age: 62
End: 2022-03-22
Attending: STUDENT IN AN ORGANIZED HEALTH CARE EDUCATION/TRAINING PROGRAM
Payer: COMMERCIAL

## 2022-03-22 DIAGNOSIS — Z12.31 VISIT FOR SCREENING MAMMOGRAM: ICD-10-CM

## 2022-03-22 PROCEDURE — 77063 BREAST TOMOSYNTHESIS BI: CPT

## 2022-06-21 ENCOUNTER — APPOINTMENT (RX ONLY)
Dept: URBAN - METROPOLITAN AREA CLINIC 35 | Facility: CLINIC | Age: 62
Setting detail: DERMATOLOGY
End: 2022-06-21

## 2022-06-21 DIAGNOSIS — L72.8 OTHER FOLLICULAR CYSTS OF THE SKIN AND SUBCUTANEOUS TISSUE: ICD-10-CM

## 2022-06-21 DIAGNOSIS — D22 MELANOCYTIC NEVI: ICD-10-CM

## 2022-06-21 DIAGNOSIS — D18.0 HEMANGIOMA: ICD-10-CM

## 2022-06-21 DIAGNOSIS — Z85.828 PERSONAL HISTORY OF OTHER MALIGNANT NEOPLASM OF SKIN: ICD-10-CM

## 2022-06-21 DIAGNOSIS — L20.89 OTHER ATOPIC DERMATITIS: ICD-10-CM

## 2022-06-21 DIAGNOSIS — L82.1 OTHER SEBORRHEIC KERATOSIS: ICD-10-CM

## 2022-06-21 DIAGNOSIS — L81.4 OTHER MELANIN HYPERPIGMENTATION: ICD-10-CM

## 2022-06-21 DIAGNOSIS — Z71.89 OTHER SPECIFIED COUNSELING: ICD-10-CM

## 2022-06-21 PROBLEM — D18.01 HEMANGIOMA OF SKIN AND SUBCUTANEOUS TISSUE: Status: ACTIVE | Noted: 2022-06-21

## 2022-06-21 PROBLEM — D22.5 MELANOCYTIC NEVI OF TRUNK: Status: ACTIVE | Noted: 2022-06-21

## 2022-06-21 PROBLEM — D23.72 OTHER BENIGN NEOPLASM OF SKIN OF LEFT LOWER LIMB, INCLUDING HIP: Status: ACTIVE | Noted: 2022-06-21

## 2022-06-21 PROBLEM — L20.84 INTRINSIC (ALLERGIC) ECZEMA: Status: ACTIVE | Noted: 2022-06-21

## 2022-06-21 PROCEDURE — ? OBSERVATION AND MEASURE

## 2022-06-21 PROCEDURE — ? SUNSCREEN RECOMMENDATIONS

## 2022-06-21 PROCEDURE — ? TREATMENT REGIMEN

## 2022-06-21 PROCEDURE — 99213 OFFICE O/P EST LOW 20 MIN: CPT

## 2022-06-21 PROCEDURE — ? COUNSELING

## 2022-06-21 PROCEDURE — ? ADDITIONAL NOTES

## 2022-06-21 ASSESSMENT — LOCATION DETAILED DESCRIPTION DERM
LOCATION DETAILED: RIGHT MEDIAL FRONTAL SCALP
LOCATION DETAILED: RIGHT PROXIMAL DORSAL FOREARM
LOCATION DETAILED: RIGHT SUPERIOR LATERAL UPPER BACK
LOCATION DETAILED: RIGHT LATERAL SUPERIOR CHEST
LOCATION DETAILED: LEFT PROXIMAL DORSAL FOREARM
LOCATION DETAILED: RIGHT TRAGUS
LOCATION DETAILED: LEFT MEDIAL UPPER BACK
LOCATION DETAILED: RIGHT SUPERIOR UPPER BACK

## 2022-06-21 ASSESSMENT — LOCATION ZONE DERM
LOCATION ZONE: EAR
LOCATION ZONE: ARM
LOCATION ZONE: SCALP
LOCATION ZONE: TRUNK

## 2022-06-21 ASSESSMENT — LOCATION SIMPLE DESCRIPTION DERM
LOCATION SIMPLE: LEFT UPPER BACK
LOCATION SIMPLE: RIGHT EAR
LOCATION SIMPLE: CHEST
LOCATION SIMPLE: RIGHT FOREARM
LOCATION SIMPLE: RIGHT SCALP
LOCATION SIMPLE: RIGHT BACK
LOCATION SIMPLE: RIGHT UPPER BACK
LOCATION SIMPLE: LEFT FOREARM

## 2022-06-21 NOTE — PROCEDURE: ADDITIONAL NOTES
Render Risk Assessment In Note?: yes
Additional Notes: Recommend consultation with plastic surgeon
Detail Level: Zone

## 2022-06-21 NOTE — PROCEDURE: TREATMENT REGIMEN
Detail Level: Detailed
Continue Regimen: triamcinolone acetonide 0.1 % cream Apply to affected skin twice a day for 10-14 days, take 1 week break, then repeat as needed. Never to face, groin or under arms

## 2022-06-21 NOTE — PROCEDURE: COUNSELING
Detail Level: Detailed
Patient Specific Counseling (Will Not Stick From Patient To Patient): Save Bx was done of DF. Lesion growing and irritated per pt. Advise excision. Pt will consider.
Detail Level: Generalized

## 2022-07-21 ENCOUNTER — OFFICE VISIT (OUTPATIENT)
Dept: INTERNAL MEDICINE | Facility: OTHER | Age: 62
End: 2022-07-21
Payer: COMMERCIAL

## 2022-07-21 VITALS
HEART RATE: 57 BPM | DIASTOLIC BLOOD PRESSURE: 68 MMHG | TEMPERATURE: 97.7 F | OXYGEN SATURATION: 98 % | SYSTOLIC BLOOD PRESSURE: 120 MMHG | BODY MASS INDEX: 22.96 KG/M2 | HEIGHT: 71 IN | WEIGHT: 164 LBS

## 2022-07-21 DIAGNOSIS — Z76.89 ENCOUNTER TO ESTABLISH CARE WITH NEW DOCTOR: ICD-10-CM

## 2022-07-21 DIAGNOSIS — M62.830 PARASPINAL MUSCLE SPASM: ICD-10-CM

## 2022-07-21 PROBLEM — J45.909 ASTHMA, MILD: Status: ACTIVE | Noted: 2022-07-21

## 2022-07-21 PROCEDURE — 99214 OFFICE O/P EST MOD 30 MIN: CPT | Mod: GC

## 2022-07-21 RX ORDER — CYCLOBENZAPRINE HCL 5 MG
5-10 TABLET ORAL 3 TIMES DAILY PRN
Qty: 30 TABLET | Refills: 1 | Status: SHIPPED
Start: 2022-07-21 | End: 2022-07-21

## 2022-07-21 RX ORDER — CYCLOBENZAPRINE HCL 5 MG
5-10 TABLET ORAL NIGHTLY
Qty: 15 TABLET | Refills: 0 | Status: SHIPPED | OUTPATIENT
Start: 2022-07-21

## 2022-07-21 SDOH — ECONOMIC STABILITY: TRANSPORTATION INSECURITY
IN THE PAST 12 MONTHS, HAS LACK OF RELIABLE TRANSPORTATION KEPT YOU FROM MEDICAL APPOINTMENTS, MEETINGS, WORK OR FROM GETTING THINGS NEEDED FOR DAILY LIVING?: NO

## 2022-07-21 SDOH — HEALTH STABILITY: PHYSICAL HEALTH: ON AVERAGE, HOW MANY DAYS PER WEEK DO YOU ENGAGE IN MODERATE TO STRENUOUS EXERCISE (LIKE A BRISK WALK)?: 6 DAYS

## 2022-07-21 SDOH — ECONOMIC STABILITY: TRANSPORTATION INSECURITY
IN THE PAST 12 MONTHS, HAS THE LACK OF TRANSPORTATION KEPT YOU FROM MEDICAL APPOINTMENTS OR FROM GETTING MEDICATIONS?: NO

## 2022-07-21 SDOH — ECONOMIC STABILITY: INCOME INSECURITY: IN THE LAST 12 MONTHS, WAS THERE A TIME WHEN YOU WERE NOT ABLE TO PAY THE MORTGAGE OR RENT ON TIME?: NO

## 2022-07-21 SDOH — ECONOMIC STABILITY: TRANSPORTATION INSECURITY
IN THE PAST 12 MONTHS, HAS LACK OF TRANSPORTATION KEPT YOU FROM MEETINGS, WORK, OR FROM GETTING THINGS NEEDED FOR DAILY LIVING?: NO

## 2022-07-21 SDOH — ECONOMIC STABILITY: HOUSING INSECURITY
IN THE LAST 12 MONTHS, WAS THERE A TIME WHEN YOU DID NOT HAVE A STEADY PLACE TO SLEEP OR SLEPT IN A SHELTER (INCLUDING NOW)?: NO

## 2022-07-21 SDOH — ECONOMIC STABILITY: HOUSING INSECURITY: IN THE LAST 12 MONTHS, HOW MANY PLACES HAVE YOU LIVED?: 1

## 2022-07-21 SDOH — ECONOMIC STABILITY: FOOD INSECURITY: WITHIN THE PAST 12 MONTHS, THE FOOD YOU BOUGHT JUST DIDN'T LAST AND YOU DIDN'T HAVE MONEY TO GET MORE.: NEVER TRUE

## 2022-07-21 SDOH — ECONOMIC STABILITY: FOOD INSECURITY: WITHIN THE PAST 12 MONTHS, YOU WORRIED THAT YOUR FOOD WOULD RUN OUT BEFORE YOU GOT MONEY TO BUY MORE.: NEVER TRUE

## 2022-07-21 SDOH — HEALTH STABILITY: PHYSICAL HEALTH: ON AVERAGE, HOW MANY MINUTES DO YOU ENGAGE IN EXERCISE AT THIS LEVEL?: 20 MIN

## 2022-07-21 SDOH — HEALTH STABILITY: MENTAL HEALTH
STRESS IS WHEN SOMEONE FEELS TENSE, NERVOUS, ANXIOUS, OR CAN'T SLEEP AT NIGHT BECAUSE THEIR MIND IS TROUBLED. HOW STRESSED ARE YOU?: NOT AT ALL

## 2022-07-21 SDOH — ECONOMIC STABILITY: INCOME INSECURITY: HOW HARD IS IT FOR YOU TO PAY FOR THE VERY BASICS LIKE FOOD, HOUSING, MEDICAL CARE, AND HEATING?: NOT HARD AT ALL

## 2022-07-21 ASSESSMENT — SOCIAL DETERMINANTS OF HEALTH (SDOH)
HOW OFTEN DO YOU ATTEND CHURCH OR RELIGIOUS SERVICES?: NEVER
HOW OFTEN DO YOU HAVE A DRINK CONTAINING ALCOHOL: 2-4 TIMES A MONTH
HOW OFTEN DO YOU ATTENT MEETINGS OF THE CLUB OR ORGANIZATION YOU BELONG TO?: PATIENT DECLINED
WITHIN THE PAST 12 MONTHS, YOU WORRIED THAT YOUR FOOD WOULD RUN OUT BEFORE YOU GOT THE MONEY TO BUY MORE: NEVER TRUE
HOW MANY DRINKS CONTAINING ALCOHOL DO YOU HAVE ON A TYPICAL DAY WHEN YOU ARE DRINKING: 1 OR 2
DO YOU BELONG TO ANY CLUBS OR ORGANIZATIONS SUCH AS CHURCH GROUPS UNIONS, FRATERNAL OR ATHLETIC GROUPS, OR SCHOOL GROUPS?: NO
HOW OFTEN DO YOU HAVE SIX OR MORE DRINKS ON ONE OCCASION: NEVER
HOW OFTEN DO YOU GET TOGETHER WITH FRIENDS OR RELATIVES?: ONCE A WEEK
IN A TYPICAL WEEK, HOW MANY TIMES DO YOU TALK ON THE PHONE WITH FAMILY, FRIENDS, OR NEIGHBORS?: THREE TIMES A WEEK
IN A TYPICAL WEEK, HOW MANY TIMES DO YOU TALK ON THE PHONE WITH FAMILY, FRIENDS, OR NEIGHBORS?: THREE TIMES A WEEK
HOW OFTEN DO YOU ATTENT MEETINGS OF THE CLUB OR ORGANIZATION YOU BELONG TO?: PATIENT DECLINED
HOW OFTEN DO YOU ATTEND CHURCH OR RELIGIOUS SERVICES?: NEVER
HOW HARD IS IT FOR YOU TO PAY FOR THE VERY BASICS LIKE FOOD, HOUSING, MEDICAL CARE, AND HEATING?: NOT HARD AT ALL
DO YOU BELONG TO ANY CLUBS OR ORGANIZATIONS SUCH AS CHURCH GROUPS UNIONS, FRATERNAL OR ATHLETIC GROUPS, OR SCHOOL GROUPS?: NO
HOW OFTEN DO YOU GET TOGETHER WITH FRIENDS OR RELATIVES?: ONCE A WEEK

## 2022-07-21 ASSESSMENT — FIBROSIS 4 INDEX: FIB4 SCORE: 1.16

## 2022-07-21 ASSESSMENT — LIFESTYLE VARIABLES
HOW OFTEN DO YOU HAVE A DRINK CONTAINING ALCOHOL: 2-4 TIMES A MONTH
HOW OFTEN DO YOU HAVE SIX OR MORE DRINKS ON ONE OCCASION: NEVER
AUDIT-C TOTAL SCORE: 2
SKIP TO QUESTIONS 9-10: 1
HOW MANY STANDARD DRINKS CONTAINING ALCOHOL DO YOU HAVE ON A TYPICAL DAY: 1 OR 2

## 2022-07-21 ASSESSMENT — PATIENT HEALTH QUESTIONNAIRE - PHQ9: CLINICAL INTERPRETATION OF PHQ2 SCORE: 0

## 2022-07-21 NOTE — PATIENT INSTRUCTIONS
-Follow-up in 1-2 month for annual exam/health maintenance visit in future.  -For back and shoulder exercises, google youtube Dr. Pedro back exercises. Can do yoga.   -Avoid movements that exacerbate pain   -Make sure mattress is firm. Prefer you sleep on your side. Make sure spine is aligned (pillow under neck, hug a pillow, pillow between the legs)  -Continue conservative therapy: Applying heat/ice to the area, lidocaine patch or voltaren gel, continue Tylenol  1000 mg 3 times daily and Ibuprofen max dose 3200 mg daily   -Use felexeril at night or when you will be home for long periods. Avoid driving or operating heavy machinery  -If pain continues to worsen come back in for further evaluation and PT referral   -It was nice visiting with you today!

## 2022-07-21 NOTE — PROGRESS NOTES
"Teaching Physician Attestation      Level of Participation    I have personally interviewed and examined the patient.  In addition, I discussed with the resident physician the patient's history, exam, assessment and plan in detail.  Topics listed in my addendum were the focus of the visit.  Healthcare maintenance was not addressed this visit unless listed as a topic in my addendum.  I agree with the plan as written along with the following additions/modifications:    Establish with New Res PCP    PMH: chronic leukopenia, ? Marfan, ? Asthma (clarify if PFTs), osteoarthritis  Fam: breast cancer M 52, father - stroke, > age 50      Right paraspinal thoracic muscle spasm likely secondary to unsupportive mattress  -Patient recently added a \"mattress Topper\" to her bed and subsequently developed pain that is worse at night, spastic in quality, just below the right shoulder blade, somewhat reproducible on palpation, no midline tenderness or alarm symptoms.    Plan  -Extensively discussed proper ergonomics, recommended obtaining a more supportive bed/removing mattress Topper to maintain proper spinal alignment  -Voltaren gel as needed, 5 mg Flexeril at night as needed if interested, to be used sparingly  -Continue walking and yoga  -Follow-up in 1 month, call sooner if needed  "

## 2022-07-22 NOTE — PROGRESS NOTES
Established Patient    Patient Care Team:  Kiarra Lester M.D. as PCP - General (Internal Medicine)  [facility] (Inactive) as Consulting Physician  Hector Saba M.D. as Consulting Physician (Obstetrics & Gynecology)    Radha Mcfarlane is a 62 y.o. female who presents today with the following Chief Complaint(s): Follow up for Diagnoses of Paraspinal muscle spasm and Encounter to establish care with new doctor were pertinent to this visit.    HPI:  Patient is a pleasant 62-year-old female with a past medical history of chronic leukopenia, dyslipidemia, osteoarthritis, asthma and Marfan syndrome who presents to the clinic today for back spasms.    Back pain started 2 nights ago at 2 AM in the morning while she was in bed.  Spasms are on the right side of her back below the right shoulder blade and occur every 15 or 30 seconds while in bed. Denies any trauma or injury. Has been scraping popcorn ceiling off for the past 3 or 4 weeks.  Reports similar symptoms in the past after she took down the fence.  States the pain is worse when she lays down.  Has no pain with activity or being upright.  Patient sleeps on her side and recently added a mattress Topper to the bed.  Denies any fevers, chills, night sweats, weight loss, back stiffness, back tenderness, bowel or bladder incontinence or lower extremity weakness/numbness.    ROS:     General: No fevers, chills, night sweats, weight loss or gain  HEENT: No hearing changes, vision changes, eye pain, ear pain, nasal discharge, sore throat  Neck: No swelling in neck  Pulmonary: No shortness of breath, cough, sputum, or hemoptysis  Cardiovascular: No chest pain, palpitations, or LE swelling  GI: No nausea, vomiting, diarrhea, constipation, abdominal pain, hematochezia or melena  : No dysuria or frequency  MSK: Back spasms.   Neuro: No focal weakness, no general weakness, no headaches, no lightheadedness, no dizziness  Psych: No anxiety or  "depression    Past Medical History:   Diagnosis Date   • Anemia 2/13/2017   • Anesthesia     difficult waking up/ n/v   • Asthma    • Breath shortness    • Constipation    • Gynecological disorder    • Insomnia    • Left shoulder pain    • Marfan syndrome 9/16/2010   • Neck pain 2/13/2017   • Neutropenia (HCC)    • Osteoarthritis 2/13/2017   • Pulmonary nodule 2/13/2017     Social History     Tobacco Use   • Smoking status: Never Smoker   • Smokeless tobacco: Never Used   Vaping Use   • Vaping Use: Never used   Substance Use Topics   • Alcohol use: Yes     Alcohol/week: 0.6 oz     Types: 1 Glasses of wine per week   • Drug use: No     Current Outpatient Medications   Medication Sig Dispense Refill   • cyclobenzaprine (FLEXERIL) 5 mg tablet Take 1-2 Tablets by mouth every evening. 15 Tablet 0   • mometasone (NASONEX) 50 MCG/ACT nasal spray Spray 2 Sprays in nose every day.     • albuterol (PROVENTIL HFA) 108 (90 Base) MCG/ACT Aero Soln inhalation aerosol Inhale 2 Puffs by mouth 4 times a day. 8.5 g 0   • loratadine (CLARITIN) 10 MG Tab Take 10 mg by mouth every day.     • ibuprofen (MOTRIN) 200 MG Tab 1-3 PO Q6H PRN pain     • acetaminophen (TYLENOL) 325 MG TABS Take 650 mg by mouth Once. Prior to Iron Dextran infusion     • Diclofenac Sodium 1 % Gel Apply 1 g to skin as directed 3 times a day as needed. (Patient not taking: Reported on 7/21/2022) 1 Tube 3     No current facility-administered medications for this visit.       Physical Exam:  /68 (BP Location: Right arm, Patient Position: Sitting, BP Cuff Size: Adult)   Pulse (!) 57   Temp 36.5 °C (97.7 °F) (Temporal)   Ht 1.803 m (5' 11\")   Wt 74.4 kg (164 lb)   SpO2 98%   BMI 22.87 kg/m²   General: Well developed, well nourished female, in no distress.  HEENT: NC/AT, PERRL, EOMI, no scleral icterus or conjunctival pallor  Neck: Supple, No cervical or supraclavicular LAD. NROM.  CV:RRR, no murmurs gallops or Rubs.  Pulm: LCAB, no crackles, rales, rhonchi, " or wheezing  GI: Normal bowel sounds, abdomen soft, nontender, nondistended to deep or light palpation in all 4 quadrants, no HSM. No CVA tenderness.   MSK: Radial and dorsalis pedis pulses 2+ and equal bilaterally, respectively.  Strength 5 out of 5 in upper and lower extremities.  No lower extremity edema   Spine: No deformity, bruising or swelling on inspection. No visible muscle spasms. No TTP along spine or SI joint. NROM. Mild pain elicited on left lateral bending.   Neuro: Patient is alert and oriented x3, no focal deficits  Psych: Appropriate mood and affect       Assessment and Plan:   1. Paraspinal muscle spasm  Likely from soft matress causing spine misalignment versus overuse  -Discussed conservative management, heat/ice to the area, lidocaine patch or voltaren gel, OTC Tylenol or ibuprofen, back stretches, doing yoga  -Recommend firmer mattress and discussed spinal alignment in bed, sleeping on side and using pillow under neck, hug a pillow, pillow between the legs.  -Prescribed felexeril 5 mg at night as needed and instructed patient to avoid driving or operating heavy machinery when using.  -Follow-up in 1 month or sooner if pain worsens   -Can consider PT referral next visit if pain not improved    2. Encounter to establish care with new doctor  -Will address vaccines and age appropriate health screens at another visit  -Follow-up  In 1 month     Return in about 1 month (around 8/21/2022).    Patient Instructions   -Follow-up in 1-2 month for annual exam/health maintenance visit in future.  -For back and shoulder exercises, google youtube Dr. Pedro back exercises. Can do yoga.   -Avoid movements that exacerbate pain   -Make sure mattress is firm. Prefer you sleep on your side. Make sure spine is aligned (pillow under neck, hug a pillow, pillow between the legs)  -Continue conservative therapy: Applying heat/ice to the area, lidocaine patch or voltaren gel, continue Tylenol  1000 mg 3 times daily and  Ibuprofen max dose 3200 mg daily   -Use felexeril at night or when you will be home for long periods. Avoid driving or operating heavy machinery  -If pain continues to worsen come back in for further evaluation and PT referral   -It was nice visiting with you today!        Kiarra Lester M.D. PGY-1  [facility]    This note was created using voice recognition software.  While every attempt is made to ensure accuracy of transcription, occasionally errors occur.

## 2022-09-07 ENCOUNTER — OFFICE VISIT (OUTPATIENT)
Dept: INTERNAL MEDICINE | Facility: OTHER | Age: 62
End: 2022-09-07
Payer: COMMERCIAL

## 2022-09-07 VITALS
DIASTOLIC BLOOD PRESSURE: 84 MMHG | SYSTOLIC BLOOD PRESSURE: 121 MMHG | WEIGHT: 163.2 LBS | BODY MASS INDEX: 22.85 KG/M2 | TEMPERATURE: 98.9 F | HEART RATE: 50 BPM | OXYGEN SATURATION: 99 % | HEIGHT: 71 IN

## 2022-09-07 DIAGNOSIS — Z00.00 HEALTH CARE MAINTENANCE: ICD-10-CM

## 2022-09-07 PROCEDURE — 99396 PREV VISIT EST AGE 40-64: CPT | Mod: GC

## 2022-09-07 ASSESSMENT — FIBROSIS 4 INDEX: FIB4 SCORE: 1.16

## 2022-09-07 NOTE — PROGRESS NOTES
Established Patient    Patient Care Team:  Kiarra Lester M.D. as PCP - General (Internal Medicine)  [facility] (Inactive) as Consulting Physician  Hector Saba M.D. as Consulting Physician (Obstetrics & Gynecology)    Radha Mcfarlane is a 62 y.o. female who presents today with the following Chief Complaint(s): Follow up for The encounter diagnosis was Health care maintenance.    HPI:  Patient is a pleasant 62-year-old female with a past medical history of chronic leukopenia, dyslipidemia, osteoarthritis, asthma and Marfan syndrome who presents to the clinic today for health maintenance visit.     She is doing fine today and has no acute complaints. Will obtain labs tomorrow with [facility]. Sees OB/GYN and gets mammogram and Pap smears done as recommended. Independent of ADLs and IADLs.  exam gets regular dental checkups every 6 months.    Health Maintenance  Hep C: Negative 6/12/2019  Diabetes: No A1c on file, patient not high risk and CMP fasting blood glucose wnl.   Colorectal Cancer: negative, internal hemorrhoids 11/21/2012. Colonoscopy every 10 years and due 11/21/2022  HIV Screening: Negative, not on file  Alcohol Misuse:  No alcohol use   Tobacco use: No tobacco use   Influenza: Needs this year influenza vaccination. Gets yearly vaccinations.   Tdap/Td: Tdap 11/2/2013. Td 9/17/2017.   Zoster (Shingles): 9/11/2021, 12/30/2021  Pneumococcal Vaccine: Not done. Series beginning at age 65  Lipid Test:  TG 65 HDL 80 , ASCVD risk 3.1% low  Cervical Cancer Screening: Pap only every 3 years for ages 21-29, Pap test with HPV screening every 5 years from ages 30-65  Mammogram: Negative 9/27/2022. Follows with OB/GYN. Gets every 1 year.  Bone Density: No osteoporosis from Dexa 10/30/2012.       ROS:     General: No fevers, chills, night sweats, weight loss or gain  HEENT: No hearing changes, vision changes, eye pain, ear pain, nasal discharge, sore  "throat  Neck: No swelling in neck  Pulmonary: No shortness of breath, cough, sputum, or hemoptysis  Cardiovascular: No chest pain, palpitations, or LE swelling  GI: No nausea, vomiting, diarrhea, constipation, abdominal pain, hematochezia or melena  : No dysuria or frequency  Neuro: No focal weakness, no general weakness, no headaches, no lightheadedness, no dizziness  Psych: No anxiety or depression    Past Medical History:   Diagnosis Date    Anemia 2/13/2017    Anesthesia     difficult waking up/ n/v    Asthma     Breath shortness     Constipation     Gynecological disorder     Insomnia     Left shoulder pain     Marfan syndrome 9/16/2010    Neck pain 2/13/2017    Neutropenia (HCC)     Osteoarthritis 2/13/2017    Pulmonary nodule 2/13/2017     Social History     Tobacco Use    Smoking status: Never    Smokeless tobacco: Never   Vaping Use    Vaping Use: Never used   Substance Use Topics    Alcohol use: Yes     Alcohol/week: 0.6 oz     Types: 1 Glasses of wine per week    Drug use: No     Current Outpatient Medications   Medication Sig Dispense Refill    cyclobenzaprine (FLEXERIL) 5 mg tablet Take 1-2 Tablets by mouth every evening. 15 Tablet 0    mometasone (NASONEX) 50 MCG/ACT nasal spray Spray 2 Sprays in nose every day.      albuterol (PROVENTIL HFA) 108 (90 Base) MCG/ACT Aero Soln inhalation aerosol Inhale 2 Puffs by mouth 4 times a day. 8.5 g 0    loratadine (CLARITIN) 10 MG Tab Take 10 mg by mouth every day.      ibuprofen (MOTRIN) 200 MG Tab 1-3 PO Q6H PRN pain      acetaminophen (TYLENOL) 325 MG TABS Take 650 mg by mouth Once. Prior to Iron Dextran infusion       No current facility-administered medications for this visit.       Physical Exam:  /84 (BP Location: Left arm, Patient Position: Sitting, BP Cuff Size: Adult)   Pulse (!) 50   Temp 37.2 °C (98.9 °F) (Temporal)   Ht 1.803 m (5' 11\")   Wt 74 kg (163 lb 3.2 oz)   SpO2 99%   BMI 22.76 kg/m²   General: Well developed, well nourished " female, in no distress.  HEENT: NC/AT, PERRL, EOMI, no scleral icterus or conjunctival pallor, fair dentition/denture in, no nasal discharge or oral erythema or exudates.   Neck: Supple, No cervical or supraclavicular LAD  CV:RRR, no murmurs gallops or Rubs.  Pulm: LCAB, no crackles, rales, rhonchi, or wheezing  GI: Normal bowel sounds, abdomen soft, nontender, nondistended to deep or light palpation in all 4 quadrants, no HSM.  MSK: Radial and dorsalis pedis pulses 2+ and equal bilaterally, respectively.  Strength 5 out of 5 in upper and lower extremities.  No lower extremity edema  Neuro: Patient is alert and oriented x3, no focal deficits  Psych: Appropriate mood and affect       Assessment and Plan:   1. Health maintenance exam   Up-to-date on age-appropriate health screenings  Patient will obtain labs tomorrow  -Discussed vaccinations with patient.  Recommend she get influenza vaccination and COVID booster as well as Tdap when appropriate.  -Counseled patient on diet and exercise  -Discussed with patient based on her vitamin D levels, we will determine if we need DEXA scan and supplementation of vitamin D and also for her to discuss with her OB/GYN about starting low-dose Premarin to help with bone density to prevent osteoporosis.    -Referral to ophthalmology for eye exam.  -Follow-up in 6 months      Return in about 6 months (around 3/7/2023).    Patient Instructions   -Follow-up in 6-12 months or sooner if needed  -Get vitamin D level done and TSH + T4  -Call the clinic if any questions about your labs  -Referral placed for ophthalmology. Someone from our office will call you in 2 weeks.   -Discuss with OB/Gyn about starting low dose of premarin (Prempro) to help with bone density.    -Get Flu shot and Covid booster at local pharmacy. Recommend getting Tdap.   -Continue diet full of fruits and vegetables and exercise  -It was nice visiting with you today!      Kiarra Lester M.D. PGY-1  [facility]  [facility]    This note was created using voice recognition software.  While every attempt is made to ensure accuracy of transcription, occasionally errors occur.

## 2022-09-07 NOTE — PATIENT INSTRUCTIONS
-Follow-up in 6-12 months or sooner if needed  -Get vitamin D level done and TSH + T4  -Call the clinic if any questions about your labs  -Referral placed for ophthalmology. Someone from our office will call you in 2 weeks.   -Discuss with OB/Gyn about starting low dose of premarin (Prempro) to help with bone density   -Get Flu shot and Covid booster at local pharmacy. Recommend getting Tdap.   -Continue diet full of fruits and vegetables and exercise  -It was nice visiting with you today!

## 2022-12-21 ENCOUNTER — APPOINTMENT (RX ONLY)
Dept: URBAN - METROPOLITAN AREA CLINIC 35 | Facility: CLINIC | Age: 62
Setting detail: DERMATOLOGY
End: 2022-12-21

## 2022-12-21 DIAGNOSIS — L82.1 OTHER SEBORRHEIC KERATOSIS: ICD-10-CM

## 2022-12-21 DIAGNOSIS — L20.89 OTHER ATOPIC DERMATITIS: ICD-10-CM

## 2022-12-21 DIAGNOSIS — Z71.89 OTHER SPECIFIED COUNSELING: ICD-10-CM

## 2022-12-21 DIAGNOSIS — L72.8 OTHER FOLLICULAR CYSTS OF THE SKIN AND SUBCUTANEOUS TISSUE: ICD-10-CM

## 2022-12-21 DIAGNOSIS — L81.4 OTHER MELANIN HYPERPIGMENTATION: ICD-10-CM

## 2022-12-21 DIAGNOSIS — D18.0 HEMANGIOMA: ICD-10-CM

## 2022-12-21 DIAGNOSIS — D22 MELANOCYTIC NEVI: ICD-10-CM

## 2022-12-21 DIAGNOSIS — L57.0 ACTINIC KERATOSIS: ICD-10-CM

## 2022-12-21 DIAGNOSIS — Z85.828 PERSONAL HISTORY OF OTHER MALIGNANT NEOPLASM OF SKIN: ICD-10-CM

## 2022-12-21 PROBLEM — L20.84 INTRINSIC (ALLERGIC) ECZEMA: Status: ACTIVE | Noted: 2022-12-21

## 2022-12-21 PROBLEM — D22.5 MELANOCYTIC NEVI OF TRUNK: Status: ACTIVE | Noted: 2022-12-21

## 2022-12-21 PROBLEM — D23.72 OTHER BENIGN NEOPLASM OF SKIN OF LEFT LOWER LIMB, INCLUDING HIP: Status: ACTIVE | Noted: 2022-12-21

## 2022-12-21 PROBLEM — D18.01 HEMANGIOMA OF SKIN AND SUBCUTANEOUS TISSUE: Status: ACTIVE | Noted: 2022-12-21

## 2022-12-21 PROCEDURE — ? OBSERVATION AND MEASURE

## 2022-12-21 PROCEDURE — ? COUNSELING

## 2022-12-21 PROCEDURE — ? SUNSCREEN RECOMMENDATIONS

## 2022-12-21 PROCEDURE — ? LIQUID NITROGEN

## 2022-12-21 PROCEDURE — 99213 OFFICE O/P EST LOW 20 MIN: CPT | Mod: 25

## 2022-12-21 PROCEDURE — ? TREATMENT REGIMEN

## 2022-12-21 PROCEDURE — ? ADDITIONAL NOTES

## 2022-12-21 PROCEDURE — 17000 DESTRUCT PREMALG LESION: CPT

## 2022-12-21 ASSESSMENT — LOCATION ZONE DERM
LOCATION ZONE: TRUNK
LOCATION ZONE: ARM
LOCATION ZONE: EAR
LOCATION ZONE: SCALP
LOCATION ZONE: LIP

## 2022-12-21 ASSESSMENT — LOCATION SIMPLE DESCRIPTION DERM
LOCATION SIMPLE: LEFT UPPER BACK
LOCATION SIMPLE: LEFT FOREARM
LOCATION SIMPLE: RIGHT BACK
LOCATION SIMPLE: RIGHT UPPER BACK
LOCATION SIMPLE: RIGHT LIP
LOCATION SIMPLE: CHEST
LOCATION SIMPLE: RIGHT SCALP
LOCATION SIMPLE: RIGHT EAR
LOCATION SIMPLE: RIGHT FOREARM

## 2022-12-21 ASSESSMENT — LOCATION DETAILED DESCRIPTION DERM
LOCATION DETAILED: RIGHT MEDIAL FRONTAL SCALP
LOCATION DETAILED: RIGHT SUPERIOR LATERAL UPPER BACK
LOCATION DETAILED: RIGHT TRAGUS
LOCATION DETAILED: RIGHT SUPERIOR UPPER BACK
LOCATION DETAILED: RIGHT INFERIOR VERMILION LIP
LOCATION DETAILED: LEFT MEDIAL UPPER BACK
LOCATION DETAILED: LEFT PROXIMAL DORSAL FOREARM
LOCATION DETAILED: RIGHT PROXIMAL DORSAL FOREARM
LOCATION DETAILED: RIGHT LATERAL SUPERIOR CHEST

## 2022-12-21 NOTE — PROCEDURE: OBSERVATION
Detail Level: Detailed
Size Of Lesion: 1 cm firm violaceous nodule, sometimes painful, pathology proven Dermatofibroma

## 2022-12-21 NOTE — PROCEDURE: COUNSELING
Detail Level: Detailed
Detail Level: Generalized
Patient Specific Counseling (Will Not Stick From Patient To Patient): Save Bx was done of DF. Lesion growing and irritated per pt. Advise excision. Pt will consider.
Detail Level: Zone

## 2022-12-21 NOTE — PROCEDURE: LIQUID NITROGEN
Consent: The patient's consent was obtained including but not limited to risks of crusting, scabbing, blistering, scarring, darker or lighter pigmentary change, recurrence, incomplete removal and infection.
Render Post-Care Instructions In Note?: no
Show Aperture Variable?: Yes
Detail Level: Detailed
Number Of Freeze-Thaw Cycles: 2 freeze-thaw cycles
Duration Of Freeze Thaw-Cycle (Seconds): 2
Post-Care Instructions: I reviewed with the patient in detail post-care instructions. Patient is to wear sunprotection, and avoid picking at any of the treated lesions. Pt may apply Vaseline to crusted or scabbing areas.

## 2023-03-23 ENCOUNTER — HOSPITAL ENCOUNTER (OUTPATIENT)
Dept: RADIOLOGY | Facility: MEDICAL CENTER | Age: 63
End: 2023-03-23
Payer: COMMERCIAL

## 2023-03-23 DIAGNOSIS — Z12.31 VISIT FOR SCREENING MAMMOGRAM: ICD-10-CM

## 2023-03-23 PROCEDURE — 77063 BREAST TOMOSYNTHESIS BI: CPT

## 2023-03-28 ENCOUNTER — TELEMEDICINE (OUTPATIENT)
Dept: INTERNAL MEDICINE | Facility: OTHER | Age: 63
End: 2023-03-28
Payer: COMMERCIAL

## 2023-03-28 VITALS — HEIGHT: 71 IN | WEIGHT: 165 LBS | BODY MASS INDEX: 23.1 KG/M2

## 2023-03-28 DIAGNOSIS — Z00.00 HEALTH CARE MAINTENANCE: ICD-10-CM

## 2023-03-28 DIAGNOSIS — Z91.09 POLLEN ALLERGY: ICD-10-CM

## 2023-03-28 DIAGNOSIS — Z91.89 AT RISK FOR BREAST CANCER: ICD-10-CM

## 2023-03-28 PROBLEM — D50.9 IRON DEFICIENCY ANEMIA, UNSPECIFIED: Status: ACTIVE | Noted: 2023-03-28

## 2023-03-28 PROBLEM — Z78.0 POST-MENOPAUSE: Status: RESOLVED | Noted: 2017-05-31 | Resolved: 2023-03-28

## 2023-03-28 PROCEDURE — 99213 OFFICE O/P EST LOW 20 MIN: CPT | Mod: GT,GE

## 2023-03-28 RX ORDER — EPINEPHRINE 0.3 MG/.3ML
INJECTION SUBCUTANEOUS
Qty: 1 EACH | Refills: 6 | Status: SHIPPED | OUTPATIENT
Start: 2023-03-28

## 2023-03-28 ASSESSMENT — FIBROSIS 4 INDEX: FIB4 SCORE: 1.18

## 2023-03-28 ASSESSMENT — PATIENT HEALTH QUESTIONNAIRE - PHQ9: CLINICAL INTERPRETATION OF PHQ2 SCORE: 0

## 2023-03-28 NOTE — PATIENT INSTRUCTIONS
-Our fax number is [phone]  -Obtain pneumococcal vaccine at any pharmacy. We also provide them at the clinic.   -I sent prescription for epi pen.  -It was nice visiting with you today!

## 2023-03-28 NOTE — PROCEDURES
"    Established Patient    Patient Care Team:  Kiarra Lester M.D. as PCP - General (Internal Medicine)  [facility] (Inactive) as Consulting Physician  Hector Saba M.D. as Consulting Physician (Obstetrics & Gynecology)    Radha Mcfarlane is a 63 y.o. female who presents today with the following Chief Complaint(s): Follow up for Diagnoses of Pollen allergy, At risk for breast cancer, and Health care maintenance were pertinent to this visit.    HPI:  1. Pollen allergy  ***    2. At risk for breast cancer  ***    3. Health care maintenance  ***      ROS:     General: No fevers, chills, night sweats, weight loss or gain  HEENT: No hearing changes, vision changes, eye pain, ear pain, nasal discharge, sore throat  Neck: No swelling in neck  Pulmonary: No shortness of breath, cough, sputum, or hemoptysis  Cardiovascular: No chest pain, palpitations, or LE swelling  GI: No nausea, vomiting, diarrhea, constipation, abdominal pain, hematochezia or melena  : No dysuria or frequency  Neuro: No focal weakness, no general weakness, no headaches, no lightheadedness, no dizziness  Psych: No anxiety or depression    Past Medical History:   Diagnosis Date    Anemia 2/13/2017    Anesthesia     difficult waking up/ n/v    Asthma     Breath shortness     Constipation     Excessive bleeding in the premenopausal period 8/7/2015    Gynecological disorder     Health care maintenance 5/31/2017    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 CT for uri- \"hyperinflated\"- no s/s COPD- no smoking hx Endometrial ablation 4/16    Insomnia     Left shoulder pain     Marfan syndrome 9/16/2010    Neck pain 2/13/2017    Neutropenia (HCC)     Osteoarthritis 2/13/2017    Post-menopause 5/31/2017    Pulmonary nodule 2/13/2017     Social History     Tobacco Use    Smoking status: Never    Smokeless tobacco: Never   Vaping Use    Vaping Use: Never used   Substance Use Topics    Alcohol use: Yes     Alcohol/week: 0.6 oz     Types: 1 " "Glasses of wine per week    Drug use: No     Current Outpatient Medications   Medication Sig Dispense Refill    EPINEPHrine (EPIPEN) 0.3 MG/0.3ML Solution Auto-injector solution for injection Inject 0.3 mL into the thigh one time for 1 dose 1 Each 6    cyclobenzaprine (FLEXERIL) 5 mg tablet Take 1-2 Tablets by mouth every evening. 15 Tablet 0    mometasone (NASONEX) 50 MCG/ACT nasal spray Spray 2 Sprays in nose every day.      albuterol (PROVENTIL HFA) 108 (90 Base) MCG/ACT Aero Soln inhalation aerosol Inhale 2 Puffs by mouth 4 times a day. 8.5 g 0    loratadine (CLARITIN) 10 MG Tab Take 10 mg by mouth every day.      ibuprofen (MOTRIN) 200 MG Tab 1-3 PO Q6H PRN pain      acetaminophen (TYLENOL) 325 MG TABS Take 650 mg by mouth Once. Prior to Iron Dextran infusion       No current facility-administered medications for this visit.       Physical Exam:  Ht 1.803 m (5' 11\")   Wt 74.8 kg (165 lb)   BMI 23.01 kg/m²   General: Well developed, well nourished female, in no distress.  HEENT: NC/AT, PERRL, EOMI, no scleral icterus or conjunctival pallor, fair dentition/denture in, no nasal discharge or oral erythema or exudates.   Neck: Supple, No cervical or supraclavicular LAD  CV:RRR, no murmurs gallops or Rubs, no JVD  Pulm: LCAB, no crackles, rales, rhonchi, or wheezing  GI: Normal bowel sounds, abdomen soft, nontender, nondistended to deep or light palpation in all 4 quadrants, no HSM.  MSK: Radial and dorsalis pedis pulses 2+ and equal bilaterally, respectively.  Strength 5 out of 5 in upper and lower extremities.  No lower extremity edema  Neuro: Patient is alert and oriented x3, no focal deficits  Psych: Appropriate mood and affect       Assessment and Plan:   1. Pollen allergy  ***    2. At risk for breast cancer  ***  - Referral to Genetic Research Studies  - Referral to Genetics  - BRCA1 and BRCA2 Sequencing; Future    3. Health care maintenance  ***      No problem-specific Assessment & Plan notes found for " this encounter.       Return in about 3 months (around 6/28/2023).    Patient Instructions   -Our fax number is [phone]  -Obtain pneumococcal vaccine at any pharmacy. We also provide them at the clinic.   -I sent prescription for epi pen.  -It was nice visiting with you today!      iKarra Lester M.D. PGY-2  [facility]    This note was created using voice recognition software.  While every attempt is made to ensure accuracy of transcription, occasionally errors occur.

## 2023-03-29 ENCOUNTER — RESEARCH ENCOUNTER (OUTPATIENT)
Dept: RESEARCH | Facility: WORKSITE | Age: 63
End: 2023-03-29
Payer: COMMERCIAL

## 2023-03-29 DIAGNOSIS — Z00.6 RESEARCH STUDY PATIENT: ICD-10-CM

## 2023-03-29 NOTE — PROGRESS NOTES
Virtual Visit: Established Patient   This visit was conducted via Zoom using secure and encrypted videoconferencing technology.   The patient was in their home in the [facility].    The patient's identity was confirmed and verbal consent was obtained for this virtual visit.    Subjective:   CC:   Chief Complaint   Patient presents with    Follow-Up     Radha Marjan Mcfarlane is a 63 y.o. female presenting for evaluation and management of:    Recently reports eating bee pollen for the first time and later breaking out in hives from head to toe.  Also reports itching of her throat, mouth and eustachian tubes.  Took 1 Benadryl, which did not help and then later took another Benadryl as well as apply topical Benadryl ointment to her skin, which improved symptoms.  Denies any nausea, vomiting, abdominal pain, cough, wheezing, shortness of breath or tachycardia.  Patient requesting an EpiPen as she will be doing a lot of outdoor activities and has never been stung by a bee.    Patient's recent mammogram from 3/2023 negative.  Family history significant for breast cancer in her mother.  Discussed BRCA testing due to patient risk.  Patient agreeable to testing.  Discussed other age-appropriate health screenings and vaccinations.  Patient does get her lab work through [facility] and reports all labs were within normal limits.  She received a colonoscopy in November 2022, which did show polyps and recommend 3-year follow-up.  Reports receiving the shingles vaccine.  We will request medical records to be sent to our office.    ROS   General: No fevers, chills, night sweats, weight loss or gain  HEENT: No hearing changes, vision changes, eye pain, ear pain, nasal discharge, sore throat  Neck: No swelling in neck  Pulmonary: No shortness of breath, cough, sputum, or hemoptysis  Cardiovascular: No chest pain, palpitations, or LE swelling  GI: No nausea, vomiting, diarrhea, constipation, abdominal pain,  "hematochezia or melena  : No dysuria or frequency  Neuro: No focal weakness, no general weakness, no headaches, no lightheadedness, no dizziness  Psych: No anxiety or depression    Current medicines (including changes today)  Current Outpatient Medications   Medication Sig Dispense Refill    EPINEPHrine (EPIPEN) 0.3 MG/0.3ML Solution Auto-injector solution for injection Inject 0.3 mL into the thigh one time for 1 dose 1 Each 6    cyclobenzaprine (FLEXERIL) 5 mg tablet Take 1-2 Tablets by mouth every evening. 15 Tablet 0    mometasone (NASONEX) 50 MCG/ACT nasal spray Spray 2 Sprays in nose every day.      albuterol (PROVENTIL HFA) 108 (90 Base) MCG/ACT Aero Soln inhalation aerosol Inhale 2 Puffs by mouth 4 times a day. 8.5 g 0    loratadine (CLARITIN) 10 MG Tab Take 10 mg by mouth every day.      ibuprofen (MOTRIN) 200 MG Tab 1-3 PO Q6H PRN pain      acetaminophen (TYLENOL) 325 MG TABS Take 650 mg by mouth Once. Prior to Iron Dextran infusion       No current facility-administered medications for this visit.       Patient Active Problem List    Diagnosis Date Noted    Iron deficiency anemia, unspecified 03/28/2023    Asthma, mild 07/21/2022    Right shoulder pain 05/31/2017    Back spasm 05/31/2017    Dyslipidemia 05/30/2017    FH: breast cancer 02/13/2017    Pulmonary nodule 02/13/2017    Neck pain 02/13/2017    Osteoarthritis 02/13/2017    Disorder of lung 12/22/2015    Mitral valve prolapse 11/21/2012    Incomplete RBBB 10/30/2012    Marfan syndrome 09/16/2010        Objective:   Ht 1.803 m (5' 11\")   Wt 74.8 kg (165 lb)   BMI 23.01 kg/m²     Physical Exam:  Constitutional: Alert, no distress, well-groomed.  Skin: No rashes in visible areas.  Eye: Round. Conjunctiva clear, lids normal. No icterus.   ENMT: Lips pink without lesions, good dentition, moist mucous membranes. Phonation normal.  Neck: No masses, no thyromegaly. Moves freely without pain.  Respiratory: Unlabored respiratory effort, no cough or " audible wheeze  Psych: Alert and oriented x3, normal affect and mood.     Assessment and Plan:   The following treatment plan was discussed:     1. Pollen allergy  Patient requesting Epipen   -Continue oral and topical benadryl for itching and hives as needed  -Continue daily loratadine for allergies   -Discussed with patient when to use Epi-pen.   -Sent prescription for Epipen    2. At risk for breast cancer  Patient's mother diagnosed with breast cancer in her 50s or 60s.  -Discussed with patient BRCA testing. Patient agreeable.   - Referral to Genetic Research Studies  - Referral to Genetics  - BRCA1 and BRCA2 Sequencing; Future    3. Health care maintenance  Patient gets lab work done through [facility]  Mammogram negative 3/2023  Colonoscopy completed 11/2022, polyps found, next colonoscopy in 3 years  Patient reports receiving Shingles vaccine  -Requested medical records   -Discussed with patient to obtain pneumococcal vaccine at any pharmacy or next clinic visit.    Other orders  - EPINEPHrine (EPIPEN) 0.3 MG/0.3ML Solution Auto-injector solution for injection; Inject 0.3 mL into the thigh one time for 1 dose  Dispense: 1 Each; Refill: 6    Patient Instructions   -Our fax number is [phone]  -Obtain pneumococcal vaccine at any pharmacy. We also provide them at the clinic.   -I sent prescription for epi pen.  -It was nice visiting with you today!      Kiarra Lester M.D. PGY-2  [facility] [employer] [facility]    This note was created using voice recognition software.  While every attempt is made to ensure accuracy of transcription, occasionally errors occur.    Follow-up: Return in about 3 months (around 6/28/2023).

## 2023-05-29 LAB
APOB+LDLR+PCSK9 GENE MUT ANL BLD/T: NOT DETECTED
BRCA1+BRCA2 DEL+DUP + FULL MUT ANL BLD/T: NOT DETECTED
MLH1+MSH2+MSH6+PMS2 GN DEL+DUP+FUL M: NOT DETECTED

## 2023-06-27 ENCOUNTER — APPOINTMENT (RX ONLY)
Dept: URBAN - METROPOLITAN AREA CLINIC 35 | Facility: CLINIC | Age: 63
Setting detail: DERMATOLOGY
End: 2023-06-27

## 2023-06-27 DIAGNOSIS — Z71.89 OTHER SPECIFIED COUNSELING: ICD-10-CM

## 2023-06-27 DIAGNOSIS — D22 MELANOCYTIC NEVI: ICD-10-CM

## 2023-06-27 DIAGNOSIS — Z85.828 PERSONAL HISTORY OF OTHER MALIGNANT NEOPLASM OF SKIN: ICD-10-CM

## 2023-06-27 DIAGNOSIS — L72.8 OTHER FOLLICULAR CYSTS OF THE SKIN AND SUBCUTANEOUS TISSUE: ICD-10-CM

## 2023-06-27 DIAGNOSIS — L20.89 OTHER ATOPIC DERMATITIS: ICD-10-CM

## 2023-06-27 DIAGNOSIS — L81.4 OTHER MELANIN HYPERPIGMENTATION: ICD-10-CM

## 2023-06-27 DIAGNOSIS — L82.1 OTHER SEBORRHEIC KERATOSIS: ICD-10-CM

## 2023-06-27 DIAGNOSIS — D18.0 HEMANGIOMA: ICD-10-CM

## 2023-06-27 DIAGNOSIS — L71.8 OTHER ROSACEA: ICD-10-CM | Status: INADEQUATELY CONTROLLED

## 2023-06-27 PROBLEM — L20.84 INTRINSIC (ALLERGIC) ECZEMA: Status: ACTIVE | Noted: 2023-06-27

## 2023-06-27 PROBLEM — D22.5 MELANOCYTIC NEVI OF TRUNK: Status: ACTIVE | Noted: 2023-06-27

## 2023-06-27 PROBLEM — D23.72 OTHER BENIGN NEOPLASM OF SKIN OF LEFT LOWER LIMB, INCLUDING HIP: Status: ACTIVE | Noted: 2023-06-27

## 2023-06-27 PROBLEM — D18.01 HEMANGIOMA OF SKIN AND SUBCUTANEOUS TISSUE: Status: ACTIVE | Noted: 2023-06-27

## 2023-06-27 PROCEDURE — ? OBSERVATION AND MEASURE

## 2023-06-27 PROCEDURE — ? ADDITIONAL NOTES

## 2023-06-27 PROCEDURE — ? SUNSCREEN RECOMMENDATIONS

## 2023-06-27 PROCEDURE — 99214 OFFICE O/P EST MOD 30 MIN: CPT

## 2023-06-27 PROCEDURE — ? TREATMENT REGIMEN

## 2023-06-27 PROCEDURE — ? COUNSELING

## 2023-06-27 PROCEDURE — ? PRESCRIPTION

## 2023-06-27 RX ORDER — METRONIDAZOLE 7.5 MG/ML
THIN LAYER LOTION TOPICAL BID
Qty: 59 | Refills: 1 | Status: ERX | COMMUNITY
Start: 2023-06-27

## 2023-06-27 RX ADMIN — METRONIDAZOLE THIN LAYER: 7.5 LOTION TOPICAL at 00:00

## 2023-06-27 ASSESSMENT — LOCATION ZONE DERM
LOCATION ZONE: ARM
LOCATION ZONE: TRUNK
LOCATION ZONE: SCALP
LOCATION ZONE: FACE
LOCATION ZONE: EAR

## 2023-06-27 ASSESSMENT — LOCATION SIMPLE DESCRIPTION DERM
LOCATION SIMPLE: RIGHT BACK
LOCATION SIMPLE: CHEST
LOCATION SIMPLE: RIGHT FOREARM
LOCATION SIMPLE: RIGHT SCALP
LOCATION SIMPLE: LEFT FOREARM
LOCATION SIMPLE: RIGHT EAR
LOCATION SIMPLE: LEFT UPPER BACK
LOCATION SIMPLE: RIGHT CHEEK
LOCATION SIMPLE: RIGHT UPPER BACK
LOCATION SIMPLE: LEFT CHEEK

## 2023-06-27 ASSESSMENT — LOCATION DETAILED DESCRIPTION DERM
LOCATION DETAILED: LEFT PROXIMAL DORSAL FOREARM
LOCATION DETAILED: RIGHT TRAGUS
LOCATION DETAILED: RIGHT CENTRAL MALAR CHEEK
LOCATION DETAILED: RIGHT LATERAL SUPERIOR CHEST
LOCATION DETAILED: RIGHT MEDIAL FRONTAL SCALP
LOCATION DETAILED: RIGHT SUPERIOR UPPER BACK
LOCATION DETAILED: RIGHT SUPERIOR LATERAL UPPER BACK
LOCATION DETAILED: LEFT MEDIAL MALAR CHEEK
LOCATION DETAILED: RIGHT PROXIMAL DORSAL FOREARM
LOCATION DETAILED: LEFT MEDIAL UPPER BACK

## 2023-06-27 NOTE — PROCEDURE: TREATMENT REGIMEN
Detail Level: Detailed
Continue Regimen: triamcinolone acetonide 0.1 % cream Apply to affected skin twice a day for 10-14 days, take 1 week break, then repeat as needed. Never to face, groin or under arms
Initiate Treatment: - Metronidazole 1% bid apply to all over face
Detail Level: Zone

## 2023-06-27 NOTE — PROCEDURE: MIPS QUALITY
Quality 111:Pneumonia Vaccination Status For Older Adults: Patient received any pneumococcal conjugate or polysaccharide vaccine on or after their 60th birthday and before the end of the measurement period
Quality 130: Documentation Of Current Medications In The Medical Record: Current Medications Documented
Quality 402: Tobacco Use And Help With Quitting Among Adolescents: Patient screened for tobacco and never smoked
Quality 110: Preventive Care And Screening: Influenza Immunization: Influenza Immunization Administered during Influenza season
Quality 226: Preventive Care And Screening: Tobacco Use: Screening And Cessation Intervention: Patient screened for tobacco use and is an ex/non-smoker
Detail Level: Detailed

## 2023-06-27 NOTE — PROCEDURE: COUNSELING
Detail Level: Detailed
Detail Level: Generalized
Patient Specific Counseling (Will Not Stick From Patient To Patient): Save Bx was done of DF. Lesion growing and irritated per pt. Advise excision. Pt will consider.
Detail Level: Zone
Laser Recommendations: KTP laser quoted 440$

## 2024-01-02 ENCOUNTER — APPOINTMENT (RX ONLY)
Dept: URBAN - METROPOLITAN AREA CLINIC 35 | Facility: CLINIC | Age: 64
Setting detail: DERMATOLOGY
End: 2024-01-02

## 2024-01-02 DIAGNOSIS — L81.4 OTHER MELANIN HYPERPIGMENTATION: ICD-10-CM

## 2024-01-02 DIAGNOSIS — Z85.828 PERSONAL HISTORY OF OTHER MALIGNANT NEOPLASM OF SKIN: ICD-10-CM

## 2024-01-02 DIAGNOSIS — L82.1 OTHER SEBORRHEIC KERATOSIS: ICD-10-CM

## 2024-01-02 DIAGNOSIS — Z71.89 OTHER SPECIFIED COUNSELING: ICD-10-CM

## 2024-01-02 DIAGNOSIS — L72.8 OTHER FOLLICULAR CYSTS OF THE SKIN AND SUBCUTANEOUS TISSUE: ICD-10-CM

## 2024-01-02 DIAGNOSIS — D18.0 HEMANGIOMA: ICD-10-CM

## 2024-01-02 DIAGNOSIS — D22 MELANOCYTIC NEVI: ICD-10-CM

## 2024-01-02 DIAGNOSIS — L57.0 ACTINIC KERATOSIS: ICD-10-CM

## 2024-01-02 PROBLEM — D18.01 HEMANGIOMA OF SKIN AND SUBCUTANEOUS TISSUE: Status: ACTIVE | Noted: 2024-01-02

## 2024-01-02 PROBLEM — D22.5 MELANOCYTIC NEVI OF TRUNK: Status: ACTIVE | Noted: 2024-01-02

## 2024-01-02 PROBLEM — D23.72 OTHER BENIGN NEOPLASM OF SKIN OF LEFT LOWER LIMB, INCLUDING HIP: Status: ACTIVE | Noted: 2024-01-02

## 2024-01-02 PROCEDURE — ? OBSERVATION AND MEASURE

## 2024-01-02 PROCEDURE — 99213 OFFICE O/P EST LOW 20 MIN: CPT | Mod: 25

## 2024-01-02 PROCEDURE — ? SUNSCREEN RECOMMENDATIONS

## 2024-01-02 PROCEDURE — ? COUNSELING

## 2024-01-02 PROCEDURE — 17000 DESTRUCT PREMALG LESION: CPT

## 2024-01-02 PROCEDURE — 17003 DESTRUCT PREMALG LES 2-14: CPT

## 2024-01-02 PROCEDURE — ? LIQUID NITROGEN

## 2024-01-02 PROCEDURE — ? ADDITIONAL NOTES

## 2024-01-02 ASSESSMENT — LOCATION SIMPLE DESCRIPTION DERM
LOCATION SIMPLE: RIGHT SCALP
LOCATION SIMPLE: CHEST
LOCATION SIMPLE: LEFT FOREARM
LOCATION SIMPLE: RIGHT BACK
LOCATION SIMPLE: LEFT UPPER BACK
LOCATION SIMPLE: RIGHT FOREARM
LOCATION SIMPLE: RIGHT EAR
LOCATION SIMPLE: RIGHT UPPER BACK

## 2024-01-02 ASSESSMENT — LOCATION ZONE DERM
LOCATION ZONE: SCALP
LOCATION ZONE: TRUNK
LOCATION ZONE: ARM
LOCATION ZONE: EAR

## 2024-01-02 ASSESSMENT — LOCATION DETAILED DESCRIPTION DERM
LOCATION DETAILED: LEFT DISTAL DORSAL FOREARM
LOCATION DETAILED: RIGHT SUPERIOR UPPER BACK
LOCATION DETAILED: LEFT PROXIMAL DORSAL FOREARM
LOCATION DETAILED: LEFT MEDIAL UPPER BACK
LOCATION DETAILED: RIGHT SUPERIOR LATERAL UPPER BACK
LOCATION DETAILED: RIGHT TRAGUS
LOCATION DETAILED: RIGHT LATERAL SUPERIOR CHEST
LOCATION DETAILED: RIGHT MEDIAL FRONTAL SCALP
LOCATION DETAILED: RIGHT PROXIMAL DORSAL FOREARM

## 2024-01-02 NOTE — PROCEDURE: ADDITIONAL NOTES
Render Risk Assessment In Note?: yes
Additional Notes: Recommend consultation with plastic surgeon
Detail Level: Zone
Additional Notes: - 1/2/24 pt gave verbal consent for Doreen to freeze spots today

## 2024-01-02 NOTE — PROCEDURE: LIQUID NITROGEN
Application Tool (Optional): Cry-AC
Show Applicator Variable?: Yes
Number Of Freeze-Thaw Cycles: 2 freeze-thaw cycles
Post-Care Instructions: I reviewed with the patient in detail post-care instructions. Patient is to wear sunprotection, and avoid picking at any of the treated lesions. Pt may apply Vaseline to crusted or scabbing areas.
Consent: The patient's consent was obtained including but not limited to risks of crusting, scabbing, blistering, scarring, darker or lighter pigmentary change, recurrence, incomplete removal and infection.
Detail Level: Detailed
Render Post-Care Instructions In Note?: no
Duration Of Freeze Thaw-Cycle (Seconds): 2

## 2024-04-02 ENCOUNTER — HOSPITAL ENCOUNTER (OUTPATIENT)
Dept: RADIOLOGY | Facility: MEDICAL CENTER | Age: 64
End: 2024-04-02
Payer: COMMERCIAL

## 2024-04-02 DIAGNOSIS — Z12.31 VISIT FOR SCREENING MAMMOGRAM: ICD-10-CM

## 2024-04-02 PROCEDURE — 77067 SCR MAMMO BI INCL CAD: CPT

## 2024-04-22 NOTE — TELEPHONE ENCOUNTER
Received request via: Pharmacy    Was the patient seen in the last year in this department? No    Does the patient have an active prescription (recently filled or refills available) for medication(s) requested? No    Pharmacy Name: [id number] - [place], [place] - [address]     Does the patient have long-term Plus and need 100 day supply (blood pressure, diabetes and cholesterol meds only)? Patient does not have SCP

## 2024-04-24 RX ORDER — EPINEPHRINE 0.3 MG/.3ML
INJECTION SUBCUTANEOUS
Qty: 2 EACH | Refills: 5 | Status: SHIPPED | OUTPATIENT
Start: 2024-04-24

## 2024-08-05 ENCOUNTER — APPOINTMENT (RX ONLY)
Dept: URBAN - METROPOLITAN AREA CLINIC 35 | Facility: CLINIC | Age: 64
Setting detail: DERMATOLOGY
End: 2024-08-05

## 2024-08-05 DIAGNOSIS — L81.4 OTHER MELANIN HYPERPIGMENTATION: ICD-10-CM

## 2024-08-05 DIAGNOSIS — D485 NEOPLASM OF UNCERTAIN BEHAVIOR OF SKIN: ICD-10-CM

## 2024-08-05 DIAGNOSIS — L82.1 OTHER SEBORRHEIC KERATOSIS: ICD-10-CM

## 2024-08-05 DIAGNOSIS — Z85.828 PERSONAL HISTORY OF OTHER MALIGNANT NEOPLASM OF SKIN: ICD-10-CM

## 2024-08-05 DIAGNOSIS — D18.0 HEMANGIOMA: ICD-10-CM

## 2024-08-05 DIAGNOSIS — L72.8 OTHER FOLLICULAR CYSTS OF THE SKIN AND SUBCUTANEOUS TISSUE: ICD-10-CM

## 2024-08-05 DIAGNOSIS — Z71.89 OTHER SPECIFIED COUNSELING: ICD-10-CM

## 2024-08-05 DIAGNOSIS — D22 MELANOCYTIC NEVI: ICD-10-CM

## 2024-08-05 DIAGNOSIS — L64.8 OTHER ANDROGENIC ALOPECIA: ICD-10-CM

## 2024-08-05 PROBLEM — D23.72 OTHER BENIGN NEOPLASM OF SKIN OF LEFT LOWER LIMB, INCLUDING HIP: Status: ACTIVE | Noted: 2024-08-05

## 2024-08-05 PROBLEM — D22.5 MELANOCYTIC NEVI OF TRUNK: Status: ACTIVE | Noted: 2024-08-05

## 2024-08-05 PROBLEM — D48.5 NEOPLASM OF UNCERTAIN BEHAVIOR OF SKIN: Status: ACTIVE | Noted: 2024-08-05

## 2024-08-05 PROBLEM — D18.01 HEMANGIOMA OF SKIN AND SUBCUTANEOUS TISSUE: Status: ACTIVE | Noted: 2024-08-05

## 2024-08-05 PROCEDURE — ? DEFER

## 2024-08-05 PROCEDURE — ? OBSERVATION AND MEASURE

## 2024-08-05 PROCEDURE — ? COUNSELING

## 2024-08-05 PROCEDURE — 99213 OFFICE O/P EST LOW 20 MIN: CPT

## 2024-08-05 PROCEDURE — ? SUNSCREEN RECOMMENDATIONS

## 2024-08-05 PROCEDURE — ? ADDITIONAL NOTES

## 2024-08-05 ASSESSMENT — LOCATION SIMPLE DESCRIPTION DERM
LOCATION SIMPLE: RIGHT UPPER BACK
LOCATION SIMPLE: SCALP
LOCATION SIMPLE: RIGHT EAR
LOCATION SIMPLE: LEFT UPPER BACK
LOCATION SIMPLE: RIGHT FOREARM
LOCATION SIMPLE: LEFT THIGH
LOCATION SIMPLE: ANTERIOR SCALP
LOCATION SIMPLE: RIGHT BACK
LOCATION SIMPLE: CHEST

## 2024-08-05 ASSESSMENT — LOCATION DETAILED DESCRIPTION DERM
LOCATION DETAILED: RIGHT SUPERIOR UPPER BACK
LOCATION DETAILED: RIGHT TRAGUS
LOCATION DETAILED: RIGHT PROXIMAL DORSAL FOREARM
LOCATION DETAILED: MID-FRONTAL SCALP
LOCATION DETAILED: LEFT MEDIAL UPPER BACK
LOCATION DETAILED: RIGHT LATERAL SUPERIOR CHEST
LOCATION DETAILED: RIGHT POSTERIOR EARLOBE
LOCATION DETAILED: RIGHT SUPERIOR PARIETAL SCALP
LOCATION DETAILED: RIGHT SUPERIOR LATERAL UPPER BACK
LOCATION DETAILED: LEFT ANTERIOR PROXIMAL THIGH

## 2024-08-05 ASSESSMENT — LOCATION ZONE DERM
LOCATION ZONE: EAR
LOCATION ZONE: TRUNK
LOCATION ZONE: ARM
LOCATION ZONE: SCALP
LOCATION ZONE: LEG

## 2024-08-05 NOTE — PROCEDURE: COUNSELING
Detail Level: Detailed
Patient Specific Counseling (Will Not Stick From Patient To Patient): Save Bx was done of DF. Lesion growing and irritated per pt. Advise excision. Pt will consider.
Detail Level: Generalized
Detail Level: Zone

## 2024-08-06 ASSESSMENT — ENCOUNTER SYMPTOMS: JOINT SWELLING: 1

## 2024-08-07 ENCOUNTER — OFFICE VISIT (OUTPATIENT)
Dept: SPORTS MEDICINE | Facility: OTHER | Age: 64
End: 2024-08-07
Payer: COMMERCIAL

## 2024-08-07 ENCOUNTER — APPOINTMENT (OUTPATIENT)
Dept: RADIOLOGY | Facility: OTHER | Age: 64
End: 2024-08-07
Attending: FAMILY MEDICINE
Payer: COMMERCIAL

## 2024-08-07 VITALS
HEIGHT: 71 IN | BODY MASS INDEX: 23.52 KG/M2 | SYSTOLIC BLOOD PRESSURE: 118 MMHG | OXYGEN SATURATION: 97 % | WEIGHT: 168 LBS | DIASTOLIC BLOOD PRESSURE: 76 MMHG | HEART RATE: 52 BPM | TEMPERATURE: 98.3 F

## 2024-08-07 DIAGNOSIS — R20.0 NUMBNESS OF TOES: ICD-10-CM

## 2024-08-07 DIAGNOSIS — M19.072 OSTEOARTHRITIS OF JOINT OF TOE OF LEFT FOOT: ICD-10-CM

## 2024-08-07 DIAGNOSIS — G57.32 SUPERFICIAL PERONEAL NERVE NEUROPATHY, LEFT: ICD-10-CM

## 2024-08-07 PROCEDURE — 99213 OFFICE O/P EST LOW 20 MIN: CPT | Performed by: FAMILY MEDICINE

## 2024-08-07 PROCEDURE — 3074F SYST BP LT 130 MM HG: CPT | Performed by: FAMILY MEDICINE

## 2024-08-07 PROCEDURE — 73660 X-RAY EXAM OF TOE(S): CPT | Mod: TC,FY,LT | Performed by: RADIOLOGY

## 2024-08-07 PROCEDURE — 3078F DIAST BP <80 MM HG: CPT | Performed by: FAMILY MEDICINE

## 2024-08-07 ASSESSMENT — ENCOUNTER SYMPTOMS: FEVER: 0

## 2024-08-07 NOTE — PROGRESS NOTES
Subjective:     Radha Mcfarlane is a 64 y.o. female who presents for Foot Problem (Left foot toe numbness)    HPI  Pt presents for evaluation of an acute problem  Pt with numbness and tingling in the toes 3 and 4 on the left foot   Started without fall or injury   Has a little bit of pain in the third toe  No rash/erythema/bruising  Does have slight bump on the third toe  No numbness in the remaining foot, ankle, shin    Review of Systems   Constitutional:  Negative for fever.   Musculoskeletal:  Positive for joint pain.   Skin:  Negative for rash.       PMH:  has a past medical history of Anemia (2/13/2017), Anesthesia, Asthma, Breath shortness, Constipation, Excessive bleeding in the premenopausal period (8/7/2015), Gynecological disorder, Health care maintenance (5/31/2017), Insomnia, Left shoulder pain, Marfan syndrome (9/16/2010), Neck pain (2/13/2017), Neutropenia (HCC), Osteoarthritis (2/13/2017), Post-menopause (5/31/2017), and Pulmonary nodule (2/13/2017).    She has no past medical history of Breast cancer (HCC).  MEDS:   Current Outpatient Medications:     EPINEPHrine (EPIPEN) 0.3 MG/0.3ML Solution Auto-injector solution for injection, INJECT ONE SYRINGE INTO THE THIGH ONE TIME AS NEEDED FOR SEVERE ALLERGIC REACTION, Disp: 2 Each, Rfl: 5    cyclobenzaprine (FLEXERIL) 5 mg tablet, Take 1-2 Tablets by mouth every evening., Disp: 15 Tablet, Rfl: 0    mometasone (NASONEX) 50 MCG/ACT nasal spray, Spray 2 Sprays in nose every day., Disp: , Rfl:     albuterol (PROVENTIL HFA) 108 (90 Base) MCG/ACT Aero Soln inhalation aerosol, Inhale 2 Puffs by mouth 4 times a day., Disp: 8.5 g, Rfl: 0    loratadine (CLARITIN) 10 MG Tab, Take 10 mg by mouth every day., Disp: , Rfl:     ibuprofen (MOTRIN) 200 MG Tab, 1-3 PO Q6H PRN pain, Disp: , Rfl:     acetaminophen (TYLENOL) 325 MG TABS, Take 650 mg by mouth Once. Prior to Iron Dextran infusion, Disp: , Rfl:   ALLERGIES:   Allergies   Allergen Reactions    Bee Pollen  "Hives     SURGHX:   Past Surgical History:   Procedure Laterality Date    HYSTEROSCOPY NOVASURE-2  4/29/2016    Procedure: HYSTEROSCOPY NOVASURE , endometrial ablation  ;  Surgeon: Hector Saba M.D.;  Location: SURGERY SAME DAY [facility];  Service:     HI CAUTER TURBINATE MUCOSA,INTRAMURAL      TONSILLECTOMY       SOCHX:  reports that she has never smoked. She has never used smokeless tobacco. She reports current alcohol use of about 0.6 oz of alcohol per week. She reports that she does not use drugs.     Objective:   /76 (BP Location: Left arm, Patient Position: Sitting)   Pulse (!) 52   Temp 36.8 °C (98.3 °F) (Temporal)   Ht 1.803 m (5' 11\")   Wt 76.2 kg (168 lb)   SpO2 97%   BMI 23.43 kg/m²     Physical Exam  Constitutional:       General: She is not in acute distress.     Appearance: She is well-developed. She is not diaphoretic.   Pulmonary:      Effort: Pulmonary effort is normal.   Neurological:      Mental Status: She is alert.     Sensation intact throughout all toes except for fifth toe has mild decreased sensation throughout the dorsal aspect.  Full range of motion throughout.  Positive Tinel's approximately 2 cm proximally from the base of third/fourth toe which reproduces tingling in the toes 3-4    Assessment/Plan:   Assessment    1. Numbness of toes  - DX-TOE(S) 2+ LEFT; Future    2. Superficial peroneal nerve neuropathy, left    3. Osteoarthritis of joint of toe of left foot    Patient with numbness and tingling in the toes 3-5.  She has a positive Tinel's over the dorsal midfoot just 2 to 3 cm proximal to the base of the toes which causes reproduction of tingling in toes 3 and 4.  Advised that this is most concerning for impingement due to tight footwear.  This spot is right where the strap of her flip-flops goes across, and suspect that those could be the culprit.  This could also be from tying shoes too tightly and advised her to be careful with her foot wear for the next 2 " months.  Avoid shoes with tightness across this particular part of the dorsal midfoot.  Wear supportive shoes that are little bit stiffer and should make great improvements.  Follow-up if not resolving over the next 2 months.

## 2024-08-19 DIAGNOSIS — R05.9 COUGH IN ADULT PATIENT: ICD-10-CM

## 2024-08-19 RX ORDER — ALBUTEROL SULFATE 90 UG/1
2 AEROSOL, METERED RESPIRATORY (INHALATION) 4 TIMES DAILY
Qty: 8.5 G | Refills: 0 | Status: SHIPPED | OUTPATIENT
Start: 2024-08-19

## 2024-08-19 NOTE — TELEPHONE ENCOUNTER
Received request via: Pharmacy    Was the patient seen in the last year in this department? Yes    Does the patient have an active prescription (recently filled or refills available) for medication(s) requested? No    Pharmacy Name: [facility]    Does the patient have [facility] Plus and need 100-day supply? (This applies to ALL medications) Patient does not have SCP

## 2024-08-19 NOTE — PROCEDURE: COUNSELING
patient
Detail Level: Detailed
Patient Specific Counseling (Will Not Stick From Patient To Patient): Save Bx was done of DF. Lesion growing and irritated per pt. Advise excision. Pt will consider.
Detail Level: Generalized

## 2024-10-29 ENCOUNTER — APPOINTMENT (RX ONLY)
Dept: URBAN - METROPOLITAN AREA CLINIC 35 | Facility: CLINIC | Age: 64
Setting detail: DERMATOLOGY
End: 2024-10-29

## 2024-10-29 DIAGNOSIS — D485 NEOPLASM OF UNCERTAIN BEHAVIOR OF SKIN: ICD-10-CM | Status: INADEQUATELY CONTROLLED

## 2024-10-29 PROBLEM — D48.5 NEOPLASM OF UNCERTAIN BEHAVIOR OF SKIN: Status: ACTIVE | Noted: 2024-10-29

## 2024-10-29 PROCEDURE — ? SURGICAL DECISION MAKING

## 2024-10-29 PROCEDURE — ? ADDITIONAL NOTES

## 2024-10-29 PROCEDURE — ? BIOPSY BY PUNCH METHOD

## 2024-10-29 PROCEDURE — 11104 PUNCH BX SKIN SINGLE LESION: CPT

## 2024-10-29 PROCEDURE — ? COUNSELING

## 2024-10-29 ASSESSMENT — LOCATION DETAILED DESCRIPTION DERM: LOCATION DETAILED: MID-FRONTAL SCALP

## 2024-10-29 ASSESSMENT — LOCATION SIMPLE DESCRIPTION DERM: LOCATION SIMPLE: ANTERIOR SCALP

## 2024-10-29 ASSESSMENT — LOCATION ZONE DERM: LOCATION ZONE: SCALP

## 2024-10-29 NOTE — PROCEDURE: ADDITIONAL NOTES
Additional Notes: - 10/29/24 used 8 mm punch; initial length: 1 cm final wound length: 1.2 cm
Detail Level: Detailed
Render Risk Assessment In Note?: yes

## 2024-10-29 NOTE — PROCEDURE: BIOPSY BY PUNCH METHOD
Detail Level: Detailed
Was A Bandage Applied: Yes
Punch Size In Mm: 8
Size Of Lesion In Cm (Optional): 0
Depth Of Punch Biopsy: dermis
Biopsy Type: H and E
Anesthesia Type: 1% lidocaine with epinephrine
Anesthesia Volume In Cc: 3
Hemostasis: Pressure
Epidermal Sutures: 4-0 Nylon
Wound Care: Petrolatum
Dressing: bandage
Suture Removal: 14 days
Patient Will Remove Sutures At Home?: No
Lab: [id number]
Lab Facility: 
Consent: Verbal consent was obtained and risks were reviewed including but not limited to scarring, infection, bleeding, scabbing, incomplete removal, nerve damage and allergy to anesthesia.
Post-Care Instructions: I reviewed with the patient in detail post-care instructions.  Patient to cleanse area with mild soap and water, apply Vaseline to area x 14 days. Patient may apply hydrogen peroxide soaks to remove any crusting.
Home Suture Removal Text: Patient was provided a home suture removal kit and will remove their sutures at home.  If they have any questions or difficulties they will call the office.
Notification Instructions: Patient will be notified of biopsy results. However, patient instructed to call the office if not contacted within 2 weeks.
Billing Type: Third-Party Bill
Information: Selecting Yes will display possible errors in your note based on the variables you have selected. This validation is only offered as a suggestion for you. PLEASE NOTE THAT THE VALIDATION TEXT WILL BE REMOVED WHEN YOU FINALIZE YOUR NOTE. IF YOU WANT TO FAX A PRELIMINARY NOTE YOU WILL NEED TO TOGGLE THIS TO 'NO' IF YOU DO NOT WANT IT IN YOUR FAXED NOTE.

## 2024-10-29 NOTE — PROCEDURE: SURGICAL DECISION MAKING
Identified Risk Factors Documented?: yes
Risk Assessment Explanation (Does Not Render In The Note): Clinical determination of the probability and/or consequences of an event, such as surgery. Clinical assessment of the level of risk is affected by the nature of the event under consideration for the patient. Modifier 57 is used to indicate an Evaluation and Management (E/M) service resulted in the initial decision to perform surgery either the day before a major surgery (90 day global) or the day of a major surgery.
Complexity (Necessary For Coding; Major - 90 Day Global With Some Exceptions; Minor - 10 Day Global): minor
Date Of Surgery - Today Or Tomorrow?: today
Discussion: We discussed not only the risks of the procedure but also the likely lopez that further treatment will be required to treat lesion. This could involve another visit here to destroy or excise  lesion, a visit to a Mohs or general or plastic surgeon, scarring, limited  activity, cosmetic imperfections.

## 2024-11-01 ENCOUNTER — HOSPITAL ENCOUNTER (OUTPATIENT)
Facility: MEDICAL CENTER | Age: 64
End: 2024-11-01
Attending: OBSTETRICS & GYNECOLOGY
Payer: COMMERCIAL

## 2024-11-01 PROCEDURE — 87624 HPV HI-RISK TYP POOLED RSLT: CPT

## 2024-11-01 PROCEDURE — 88142 CYTOPATH C/V THIN LAYER: CPT

## 2024-11-08 SDOH — HEALTH STABILITY: PHYSICAL HEALTH: ON AVERAGE, HOW MANY MINUTES DO YOU ENGAGE IN EXERCISE AT THIS LEVEL?: 50 MIN

## 2024-11-08 SDOH — ECONOMIC STABILITY: INCOME INSECURITY: IN THE LAST 12 MONTHS, WAS THERE A TIME WHEN YOU WERE NOT ABLE TO PAY THE MORTGAGE OR RENT ON TIME?: NO

## 2024-11-08 SDOH — ECONOMIC STABILITY: FOOD INSECURITY: WITHIN THE PAST 12 MONTHS, YOU WORRIED THAT YOUR FOOD WOULD RUN OUT BEFORE YOU GOT MONEY TO BUY MORE.: NEVER TRUE

## 2024-11-08 SDOH — ECONOMIC STABILITY: FOOD INSECURITY: WITHIN THE PAST 12 MONTHS, THE FOOD YOU BOUGHT JUST DIDN'T LAST AND YOU DIDN'T HAVE MONEY TO GET MORE.: NEVER TRUE

## 2024-11-08 SDOH — HEALTH STABILITY: PHYSICAL HEALTH: ON AVERAGE, HOW MANY DAYS PER WEEK DO YOU ENGAGE IN MODERATE TO STRENUOUS EXERCISE (LIKE A BRISK WALK)?: 6 DAYS

## 2024-11-08 SDOH — ECONOMIC STABILITY: INCOME INSECURITY: HOW HARD IS IT FOR YOU TO PAY FOR THE VERY BASICS LIKE FOOD, HOUSING, MEDICAL CARE, AND HEATING?: NOT HARD AT ALL

## 2024-11-08 ASSESSMENT — LIFESTYLE VARIABLES
HOW OFTEN DO YOU HAVE SIX OR MORE DRINKS ON ONE OCCASION: NEVER
AUDIT-C TOTAL SCORE: 3
HOW MANY STANDARD DRINKS CONTAINING ALCOHOL DO YOU HAVE ON A TYPICAL DAY: 1 OR 2
SKIP TO QUESTIONS 9-10: 1
HOW OFTEN DO YOU HAVE A DRINK CONTAINING ALCOHOL: 2-3 TIMES A WEEK

## 2024-11-08 ASSESSMENT — SOCIAL DETERMINANTS OF HEALTH (SDOH)
HOW HARD IS IT FOR YOU TO PAY FOR THE VERY BASICS LIKE FOOD, HOUSING, MEDICAL CARE, AND HEATING?: NOT HARD AT ALL
HOW OFTEN DO YOU ATTEND CHURCH OR RELIGIOUS SERVICES?: NEVER
HOW OFTEN DO YOU ATTEND CHURCH OR RELIGIOUS SERVICES?: NEVER
HOW MANY DRINKS CONTAINING ALCOHOL DO YOU HAVE ON A TYPICAL DAY WHEN YOU ARE DRINKING: 1 OR 2
HOW OFTEN DO YOU GET TOGETHER WITH FRIENDS OR RELATIVES?: ONCE A WEEK
WITHIN THE PAST 12 MONTHS, YOU WORRIED THAT YOUR FOOD WOULD RUN OUT BEFORE YOU GOT THE MONEY TO BUY MORE: NEVER TRUE
IN THE PAST 12 MONTHS, HAS THE ELECTRIC, GAS, OIL, OR WATER COMPANY THREATENED TO SHUT OFF SERVICE IN YOUR HOME?: NO
HOW OFTEN DO YOU HAVE A DRINK CONTAINING ALCOHOL: 2-3 TIMES A WEEK
DO YOU BELONG TO ANY CLUBS OR ORGANIZATIONS SUCH AS CHURCH GROUPS UNIONS, FRATERNAL OR ATHLETIC GROUPS, OR SCHOOL GROUPS?: YES
IN A TYPICAL WEEK, HOW MANY TIMES DO YOU TALK ON THE PHONE WITH FAMILY, FRIENDS, OR NEIGHBORS?: MORE THAN THREE TIMES A WEEK
HOW OFTEN DO YOU HAVE SIX OR MORE DRINKS ON ONE OCCASION: NEVER
DO YOU BELONG TO ANY CLUBS OR ORGANIZATIONS SUCH AS CHURCH GROUPS UNIONS, FRATERNAL OR ATHLETIC GROUPS, OR SCHOOL GROUPS?: YES
HOW OFTEN DO YOU GET TOGETHER WITH FRIENDS OR RELATIVES?: ONCE A WEEK
HOW OFTEN DO YOU ATTENT MEETINGS OF THE CLUB OR ORGANIZATION YOU BELONG TO?: MORE THAN 4 TIMES PER YEAR
IN A TYPICAL WEEK, HOW MANY TIMES DO YOU TALK ON THE PHONE WITH FAMILY, FRIENDS, OR NEIGHBORS?: MORE THAN THREE TIMES A WEEK
HOW OFTEN DO YOU ATTENT MEETINGS OF THE CLUB OR ORGANIZATION YOU BELONG TO?: MORE THAN 4 TIMES PER YEAR

## 2024-11-11 ENCOUNTER — APPOINTMENT (OUTPATIENT)
Dept: MEDICAL GROUP | Facility: PHYSICIAN GROUP | Age: 64
End: 2024-11-11
Payer: COMMERCIAL

## 2024-11-11 VITALS
HEART RATE: 64 BPM | BODY MASS INDEX: 23.8 KG/M2 | WEIGHT: 170 LBS | SYSTOLIC BLOOD PRESSURE: 96 MMHG | DIASTOLIC BLOOD PRESSURE: 64 MMHG | OXYGEN SATURATION: 99 % | HEIGHT: 71 IN

## 2024-11-11 DIAGNOSIS — E78.5 DYSLIPIDEMIA: ICD-10-CM

## 2024-11-11 DIAGNOSIS — Z00.00 HEALTHCARE MAINTENANCE: ICD-10-CM

## 2024-11-11 DIAGNOSIS — Q87.40 MARFAN'S SYNDROME: Chronic | ICD-10-CM

## 2024-11-11 DIAGNOSIS — R91.1 PULMONARY NODULE: ICD-10-CM

## 2024-11-11 LAB
HPV I/H RISK 1 DNA SPEC QL NAA+PROBE: NOT DETECTED
SPECIMEN SOURCE: NORMAL
THINPREP PAP, CYTOLOGY NL11781: NORMAL

## 2024-11-11 PROCEDURE — 3074F SYST BP LT 130 MM HG: CPT | Performed by: FAMILY MEDICINE

## 2024-11-11 PROCEDURE — 3078F DIAST BP <80 MM HG: CPT | Performed by: FAMILY MEDICINE

## 2024-11-11 PROCEDURE — 99204 OFFICE O/P NEW MOD 45 MIN: CPT | Performed by: FAMILY MEDICINE

## 2024-11-11 ASSESSMENT — PATIENT HEALTH QUESTIONNAIRE - PHQ9: CLINICAL INTERPRETATION OF PHQ2 SCORE: 0

## 2024-11-11 NOTE — LETTER
[facility]  Ying Martínez M.D.  [address]  [place] [address]  Fax: [phone]   Authorization for Release/Disclosure of   Protected Health Information   Name: NIKA GALLOWAY : 1960 SSN: [id number]   Address: [address] [place] [address]  [place] [address] Phone:    [phone] (home)    I authorize the entity listed below to release/disclose the PHI below to:   [facility]/Ying Martínez M.D. and Ying Martínez M.D.   Provider or Entity Name:  [facility]   Address   [facility], Zip            [address], [place] [address] Phone:  [phone]      Fax:       [phone]        Reason for request: continuity of care   Information to be released:    [ X ] LAST COLONOSCOPY,  including any PATH REPORT and follow-up  [ X ] LAST FIT/COLOGUARD RESULT [  ] LAST DEXA  [  ] LAST MAMMOGRAM  [  ] LAST PAP  [  ] LAST LABS [  ] RETINA EXAM REPORT  [  ] IMMUNIZATION RECORDS  [  ] Release all info      [  ] Check here and initial the line next to each item to release ALL health information INCLUDING  _____ Care and treatment for drug and / or alcohol abuse  _____ HIV testing, infection status, or AIDS  _____ Genetic Testing    DATES OF SERVICE OR TIME PERIOD TO BE DISCLOSED: _____________  I understand and acknowledge that:  * This Authorization may be revoked at any time by you in writing, except if your health information has already been used or disclosed.  * Your health information that will be used or disclosed as a result of you signing this authorization could be re-disclosed by the recipient. If this occurs, your re-disclosed health information may no longer be protected by State or Federal laws.  * You may refuse to sign this Authorization. Your refusal will not affect your ability to obtain treatment.  * This Authorization becomes effective upon signing and will  on (date) __________.      If no date is indicated, this Authorization will  one (1) year from the  signature date.    Name: Radha Mcfarlane    Signature:   Date:     11/11/2024       PLEASE FAX REQUESTED RECORDS BACK TO: [phone]

## 2024-11-11 NOTE — PROGRESS NOTES
Verbal consent was acquired by the patient to use [employer] ambient listening note generation during this visit.    Subjective:     HPI:   History of Present Illness  The patient is a 64-year-old female who presents today to [facility] care as a new patient.    She has a history of lung disorder, hyperlipidemia, incomplete right bundle branch block, iron deficiency anemia, Marfan syndrome, mitral valve prolapse, osteoarthritis, a pulmonary nodule, and asthma. Her current medications include albuterol, Nasonex, Claritin, Tylenol, Flexeril as needed, and an EpiPen on an as-needed basis.    She reports that her recent lab results were satisfactory. She has been making significant efforts to improve her health through diet and exercise. She has never had high cholesterol but noticed an increase after menopause. Her thyroid and vitamin D levels were normal in the past. She has not had a calcium coronary calcium scoring.    She has received three different vaccines for shingles, including one doses of Zostavax and two of Shingrix, but has not yet received her third dose. She recently completed screening labs and a Pap smear. She is not interested in HIV screening. Her last colonoscopy was in April 2022, which revealed some polyps, and she plans to have another in five years. Her last mammogram was in April 2024.    She has a history of Marfan syndrome and has not seen a cardiologist for a while. Her last sonogram was probably 10 years ago. She regularly sees an eye doctor. She has mitral valve prolapse, which was diagnosed during pregnancy. She has a pulmonary nodule, which she has not followed up on due to COVID-19.    She reports no unintentional weight loss. She has gained and lost the same 10 pounds. She sometimes experiences muscle spasms, which she attributes to dehydration and fatigue. She tries to exercise regularly and avoid overexertion. She does not like taking Flexeril as it makes her dizzy. She reports no  "numbness or tingling in the arms.     She regularly sees a dentist and ophthalmologist.    She has issues with sleep quality and avoids drinking wine as it disrupts her sleep. She is active and tries to hike and do yoga. She recently started doing cardio twice a week. She was previously doing hot yoga and Pilates but stopped exercising for three years after detention.    SOCIAL HISTORY  She is a retired teacher. She denies smoking. She drinks a small glass of wine at dinner.    FAMILY HISTORY  Both her parents had cardiovascular disease. Her mother had breast cancer twice. Her mother  of interstitial lung disease.    Health Maintenance: Completed    Objective:     Exam:  BP 96/64 (BP Location: Left arm, Patient Position: Sitting, BP Cuff Size: Adult)   Pulse 64   Ht 1.803 m (5' 11\")   Wt 77.1 kg (170 lb)   SpO2 99%   BMI 23.71 kg/m²  Body mass index is 23.71 kg/m².    Physical Exam  Vitals reviewed.   Constitutional:       Appearance: Normal appearance.   HENT:      Head: Normocephalic and atraumatic.      Right Ear: External ear normal.      Left Ear: External ear normal.      Nose: Nose normal.   Cardiovascular:      Rate and Rhythm: Normal rate and regular rhythm.      Pulses: Normal pulses.      Heart sounds: Normal heart sounds. No murmur heard.  Pulmonary:      Effort: Pulmonary effort is normal. No respiratory distress.      Breath sounds: Normal breath sounds. No wheezing.   Abdominal:      General: Abdomen is flat.      Palpations: Abdomen is soft.   Skin:     General: Skin is warm and dry.   Neurological:      Mental Status: She is alert and oriented to person, place, and time.   Psychiatric:         Mood and Affect: Mood normal.         Behavior: Behavior normal.             Results  Laboratory Studies  HDL is good, triglycerides are good, LDL is not great, total cholesterol is high, particle numbers are high. White blood cell count is a little low. Another cholesterol marker is high. Pap smear: " Negative for intraepithelial lesion or malignancy, HPV was negative.    Assessment & Plan:     1. Dyslipidemia  CT-HEART W/O CONT EVAL CALCIUM    Lipid Profile      2. Healthcare maintenance  TSH WITH REFLEX TO FT4    HEMOGLOBIN A1C    Comp Metabolic Panel    CBC WITH DIFFERENTIAL    EC-ECHOCARDIOGRAM COMPLETE W/O CONT      3. Pulmonary nodule  CT-CHEST (THORAX) W/O      4. Marfan syndrome  EC-ECHOCARDIOGRAM COMPLETE W/O CONT          Assessment & Plan  1. Hyperlipidemia.  Her HDL and triglycerides are within normal range, however, her LDL and total cholesterol are elevated. This is likely due to genetic factors. Lifestyle modifications alone are insufficient, and medication is warranted. A coronary calcium scoring will be conducted before starting statin therapy to assess the presence of plaques within the arteries.    2. Leukopenia.  Her white blood cell count is slightly low, which is typical for her. A differential will be conducted at some point to determine the type of leukopenia.    3. Marfan Syndrome.  She has a history of Marfan syndrome. An echocardiogram will be ordered due to her history of mitral valve prolapse and to monitor for any cardiac abnormalities. She was advised to see an ophthalmologist regularly due to potential eye changes associated with Marfan syndrome.    4. Pulmonary Nodule.  She has a pulmonary nodule, which appears stable on previous imaging. A CT scan of the chest will be ordered to monitor the pulmonary nodule.    5. Muscle Spasms.  She experiences muscle spasms, particularly in the thoracic shoulder area. She was advised to try physical therapy, acupuncture, cupping, foam rolling, and using a tennis ball or lacrosse ball against the wall to alleviate muscle tension. Heat application is recommended over ice, and she can take Flexeril as needed.    6. Health Maintenance.  Her Pap smear results were negative for intraepithelial lesion or malignancy, and HPV was also negative. Her last  mammogram in April 2024 was normal, and she will continue with annual mammograms due to her family history of breast cancer. A full panel of labs, including CBC with differential, thyroid, sugar over a 3-month period, cholesterol level, electrolytes, kidney function, and liver function, will be ordered for her next visit around March 2025. She is up to date on her vaccinations.          Return in about 5 months (around 4/11/2025) for Annual/wellness visit.    Please note that this dictation was created using voice recognition software. I have made every reasonable attempt to correct obvious errors, but I expect that there are errors of grammar and possibly content that I did not discover before finalizing the note.    Ying Martínez MD  Family Medicine and Non - Operative Sports Medicine   [facility]

## 2024-11-20 ENCOUNTER — HOSPITAL ENCOUNTER (OUTPATIENT)
Dept: RADIOLOGY | Facility: MEDICAL CENTER | Age: 64
End: 2024-11-20
Attending: FAMILY MEDICINE
Payer: COMMERCIAL

## 2024-11-20 ENCOUNTER — APPOINTMENT (RX ONLY)
Dept: URBAN - METROPOLITAN AREA CLINIC 35 | Facility: CLINIC | Age: 64
Setting detail: DERMATOLOGY
End: 2024-11-20

## 2024-11-20 DIAGNOSIS — Z48.02 ENCOUNTER FOR REMOVAL OF SUTURES: ICD-10-CM

## 2024-11-20 DIAGNOSIS — R91.1 PULMONARY NODULE: ICD-10-CM

## 2024-11-20 DIAGNOSIS — E78.5 DYSLIPIDEMIA: ICD-10-CM

## 2024-11-20 PROCEDURE — ? SUTURE REMOVAL (GLOBAL PERIOD)

## 2024-11-20 PROCEDURE — 71250 CT THORAX DX C-: CPT

## 2024-11-20 PROCEDURE — 4410556 CT-CARDIAC SCORING (SELF PAY ONLY)

## 2024-11-20 ASSESSMENT — LOCATION DETAILED DESCRIPTION DERM: LOCATION DETAILED: MID-FRONTAL SCALP

## 2024-11-20 ASSESSMENT — LOCATION SIMPLE DESCRIPTION DERM: LOCATION SIMPLE: ANTERIOR SCALP

## 2024-11-20 ASSESSMENT — LOCATION ZONE DERM: LOCATION ZONE: SCALP

## 2024-11-20 NOTE — PROCEDURE: SUTURE REMOVAL (GLOBAL PERIOD)
Detail Level: Detailed
Add 32080 Cpt? (Important Note: In 2017 The Use Of 85012 Is Being Tracked By Cms To Determine Future Global Period Reimbursement For Global Periods): no

## 2024-12-04 ENCOUNTER — ANCILLARY PROCEDURE (OUTPATIENT)
Dept: CARDIOLOGY | Facility: MEDICAL CENTER | Age: 64
End: 2024-12-04
Attending: FAMILY MEDICINE
Payer: COMMERCIAL

## 2024-12-04 DIAGNOSIS — Q87.40 MARFAN'S SYNDROME: Chronic | ICD-10-CM

## 2024-12-04 DIAGNOSIS — Z00.00 HEALTHCARE MAINTENANCE: ICD-10-CM

## 2024-12-04 LAB — LV EJECT FRACT  99904: 63

## 2024-12-04 PROCEDURE — 93306 TTE W/DOPPLER COMPLETE: CPT | Mod: 26 | Performed by: INTERNAL MEDICINE

## 2024-12-04 PROCEDURE — 93356 MYOCRD STRAIN IMG SPCKL TRCK: CPT | Performed by: INTERNAL MEDICINE

## 2024-12-04 PROCEDURE — 93356 MYOCRD STRAIN IMG SPCKL TRCK: CPT

## 2024-12-23 DIAGNOSIS — R05.9 COUGH IN ADULT PATIENT: ICD-10-CM

## 2024-12-23 RX ORDER — ALBUTEROL SULFATE 90 UG/1
2 INHALANT RESPIRATORY (INHALATION) 4 TIMES DAILY
Qty: 6.7 G | Refills: 0 | Status: SHIPPED | OUTPATIENT
Start: 2024-12-23

## 2025-02-04 ENCOUNTER — APPOINTMENT (OUTPATIENT)
Dept: URBAN - METROPOLITAN AREA CLINIC 35 | Facility: CLINIC | Age: 65
Setting detail: DERMATOLOGY
End: 2025-02-04

## 2025-02-04 DIAGNOSIS — L72.8 OTHER FOLLICULAR CYSTS OF THE SKIN AND SUBCUTANEOUS TISSUE: ICD-10-CM

## 2025-02-04 DIAGNOSIS — L64.8 OTHER ANDROGENIC ALOPECIA: ICD-10-CM

## 2025-02-04 DIAGNOSIS — D18.0 HEMANGIOMA: ICD-10-CM

## 2025-02-04 DIAGNOSIS — D22 MELANOCYTIC NEVI: ICD-10-CM

## 2025-02-04 DIAGNOSIS — L82.1 OTHER SEBORRHEIC KERATOSIS: ICD-10-CM

## 2025-02-04 DIAGNOSIS — Z85.828 PERSONAL HISTORY OF OTHER MALIGNANT NEOPLASM OF SKIN: ICD-10-CM

## 2025-02-04 DIAGNOSIS — L81.4 OTHER MELANIN HYPERPIGMENTATION: ICD-10-CM

## 2025-02-04 DIAGNOSIS — Z71.89 OTHER SPECIFIED COUNSELING: ICD-10-CM

## 2025-02-04 PROBLEM — D23.72 OTHER BENIGN NEOPLASM OF SKIN OF LEFT LOWER LIMB, INCLUDING HIP: Status: ACTIVE | Noted: 2025-02-04

## 2025-02-04 PROBLEM — D22.5 MELANOCYTIC NEVI OF TRUNK: Status: ACTIVE | Noted: 2025-02-04

## 2025-02-04 PROBLEM — D18.01 HEMANGIOMA OF SKIN AND SUBCUTANEOUS TISSUE: Status: ACTIVE | Noted: 2025-02-04

## 2025-02-04 PROCEDURE — ? SUNSCREEN RECOMMENDATIONS

## 2025-02-04 PROCEDURE — 99213 OFFICE O/P EST LOW 20 MIN: CPT

## 2025-02-04 PROCEDURE — ? COUNSELING

## 2025-02-04 PROCEDURE — ? ADDITIONAL NOTES

## 2025-02-04 PROCEDURE — ? OBSERVATION

## 2025-02-04 ASSESSMENT — LOCATION ZONE DERM
LOCATION ZONE: TRUNK
LOCATION ZONE: EAR
LOCATION ZONE: ARM
LOCATION ZONE: SCALP
LOCATION ZONE: LEG
LOCATION ZONE: FACE

## 2025-02-04 ASSESSMENT — LOCATION DETAILED DESCRIPTION DERM
LOCATION DETAILED: RIGHT SUPERIOR PARIETAL SCALP
LOCATION DETAILED: RIGHT SUPERIOR LATERAL UPPER BACK
LOCATION DETAILED: LEFT ANTERIOR PROXIMAL THIGH
LOCATION DETAILED: RIGHT SUPERIOR UPPER BACK
LOCATION DETAILED: RIGHT PROXIMAL DORSAL FOREARM
LOCATION DETAILED: RIGHT TRAGUS
LOCATION DETAILED: LEFT MEDIAL UPPER BACK
LOCATION DETAILED: RIGHT POSTERIOR EARLOBE
LOCATION DETAILED: RIGHT LATERAL SUPERIOR CHEST
LOCATION DETAILED: RIGHT INFERIOR MEDIAL MALAR CHEEK

## 2025-02-04 ASSESSMENT — LOCATION SIMPLE DESCRIPTION DERM
LOCATION SIMPLE: SCALP
LOCATION SIMPLE: RIGHT UPPER BACK
LOCATION SIMPLE: RIGHT EAR
LOCATION SIMPLE: CHEST
LOCATION SIMPLE: LEFT UPPER BACK
LOCATION SIMPLE: RIGHT BACK
LOCATION SIMPLE: RIGHT FOREARM
LOCATION SIMPLE: LEFT THIGH
LOCATION SIMPLE: RIGHT CHEEK

## 2025-03-03 ENCOUNTER — OFFICE VISIT (OUTPATIENT)
Dept: MEDICAL GROUP | Facility: PHYSICIAN GROUP | Age: 65
End: 2025-03-03
Payer: COMMERCIAL

## 2025-03-03 VITALS
OXYGEN SATURATION: 95 % | DIASTOLIC BLOOD PRESSURE: 62 MMHG | SYSTOLIC BLOOD PRESSURE: 110 MMHG | TEMPERATURE: 97.5 F | BODY MASS INDEX: 23.72 KG/M2 | WEIGHT: 169.4 LBS | HEART RATE: 64 BPM | HEIGHT: 71 IN

## 2025-03-03 DIAGNOSIS — S89.91XA INJURY OF RIGHT KNEE, INITIAL ENCOUNTER: ICD-10-CM

## 2025-03-03 PROCEDURE — 3074F SYST BP LT 130 MM HG: CPT | Performed by: FAMILY MEDICINE

## 2025-03-03 PROCEDURE — 99213 OFFICE O/P EST LOW 20 MIN: CPT | Performed by: FAMILY MEDICINE

## 2025-03-03 PROCEDURE — 3078F DIAST BP <80 MM HG: CPT | Performed by: FAMILY MEDICINE

## 2025-03-03 ASSESSMENT — PATIENT HEALTH QUESTIONNAIRE - PHQ9: CLINICAL INTERPRETATION OF PHQ2 SCORE: 0

## 2025-03-04 NOTE — PROGRESS NOTES
"Verbal consent was acquired by the patient to use [employer] ambient listening note generation during this visit.    Subjective:     HPI:   History of Present Illness  The patient presents for evaluation of left knee pain.    She has been experiencing intermittent left knee pain for the past 3 weeks, which she attributes to overuse. She reports significant prepatellar swelling, which has since subsided. Despite the pain, she denies any instability or buckling of the knee. The patient has a history of recurrent patellar dislocation of the right knee. She has been unable to perform deep squats or sit on the ground without using her hands for support. She expresses a preference for non-operative treatment options and wishes to avoid surgical intervention. The patient has been participating in [employer] since November 2024 and has achieved significant results. Additionally, she engages in gardening activities. She has not engaged in running for approximately 35 years and is inquiring about the feasibility of resuming this activity. She has a history of arthritis in her toe and has experienced loss of sensation in her toes secondary to a pinched nerve. She acknowledges that running exacerbates her symptoms. She has been managing the pain with cryotherapy and naproxen. An x-ray of her left knee was performed in 2022.    Supplemental Information  The patient has undergone cardiac and pulmonary imaging. Her heart rate is reported to be excellent, and at [employer], her maximum heart rate is recorded at 185, which she feels is too high for her age.    MEDICATIONS  Aleve    Health Maintenance: Completed    Objective:     Exam:  /62 (BP Location: Left arm, Patient Position: Sitting, BP Cuff Size: Adult)   Pulse 64   Temp 36.4 °C (97.5 °F) (Temporal)   Ht 1.803 m (5' 10.98\")   Wt 76.8 kg (169 lb 6.4 oz)   SpO2 95%   BMI 23.64 kg/m²  Body mass index is 23.64 kg/m².    Physical Exam  Vitals reviewed. "   Constitutional:       Appearance: Normal appearance.   HENT:      Head: Normocephalic and atraumatic.      Right Ear: External ear normal.      Left Ear: External ear normal.      Nose: Nose normal.   Cardiovascular:      Rate and Rhythm: Normal rate and regular rhythm.      Pulses: Normal pulses.      Heart sounds: Normal heart sounds. No murmur heard.  Pulmonary:      Effort: Pulmonary effort is normal. No respiratory distress.      Breath sounds: Normal breath sounds. No wheezing.   Abdominal:      General: Abdomen is flat.      Palpations: Abdomen is soft.   Skin:     General: Skin is warm and dry.   Neurological:      Mental Status: She is alert and oriented to person, place, and time.   Psychiatric:         Mood and Affect: Mood normal.         Behavior: Behavior normal.     General: The patient is awake, alert, oriented. Dressed appropriately with good eye contact. No acute distress.   Examination of the RIGHT knee: Tenderness to palpation of the medial joint line. Minimal ttp lateral joint line.  NO tenderness to the medial or lateral tibial plateau. NO tenderness to the medial or lateral femoral condyles. + to the medial and lateral patella facets. 5/5 strength of the extensor mechanism. + Orin's sign. - Lachman's. - Anterior/Posterior drawer. - Varus/Valgus stress. There is no swelling. Sensation is grossly intact.  2+ DP pulse.   Hip: No pain with a log roll or gentle internal, external, flexion forces of the hip.   Skin: No warmth, redness, heat or rashes. Warm, dry.           Results      Assessment & Plan:     1. Injury of right knee, initial encounter  DX-KNEE COMPLETE 4+ RIGHT          Assessment & Plan  1. Left knee pain.  The symptoms suggest a potential irritation of the meniscus, possibly due to overuse or a minor tweak in the knee's internal aspect. The MCL appears to be unaffected. The presence of fluid may be causing a mechanical blockage, limiting range of motion. There is no evidence  of a patellar dislocation. She is advised to avoid activities such as running, jumping, squat jumps, and box jumps, which could exacerbate the condition. Instead, she is encouraged to engage in swimming, cycling, and lifting, with modifications to avoid deep knee bends. Pilates is also recommended. She is advised to rest the knee in the short term and avoid rotational maneuvers that could cause irritation. The use of a knee sleeve or brace is suggested for additional stability during high-level activities, but not for continuous wear to prevent stiffness. She should continue applying ice, taking anti-inflammatories, and performing gentle range of motion exercises. She is advised to take a break from [employer] and gardening for the week. Walking, elliptical, and cycling are recommended as beneficial exercises for her knee. She is advised to continue icing the knee up to four times daily for approximately 20 minutes each session and to take over-the-counter Aleve 220 mg twice daily. An x-ray will be ordered to assess the joint space and identify the source of the pain. Exercises targeting the MCL and medial meniscus will be provided. If the x-ray reveals significant arthritis, non-operative treatment will be considered. If symptoms persist despite physical therapy, a cortisone injection may be administered. If all non-operative treatments fail to alleviate the symptoms, surgical options will be discussed.      Return in about 5 weeks (around 4/7/2025).    Please note that this dictation was created using voice recognition software. I have made every reasonable attempt to correct obvious errors, but I expect that there are errors of grammar and possibly content that I did not discover before finalizing the note.    Ying Martínez MD  Family Medicine and Non - Operative Sports Medicine   [facility]

## 2025-03-06 ENCOUNTER — HOSPITAL ENCOUNTER (OUTPATIENT)
Dept: RADIOLOGY | Facility: MEDICAL CENTER | Age: 65
End: 2025-03-06
Attending: FAMILY MEDICINE
Payer: COMMERCIAL

## 2025-03-06 ENCOUNTER — RESULTS FOLLOW-UP (OUTPATIENT)
Dept: MEDICAL GROUP | Facility: PHYSICIAN GROUP | Age: 65
End: 2025-03-06
Payer: COMMERCIAL

## 2025-03-06 DIAGNOSIS — S89.91XA INJURY OF RIGHT KNEE, INITIAL ENCOUNTER: ICD-10-CM

## 2025-03-06 PROCEDURE — 73564 X-RAY EXAM KNEE 4 OR MORE: CPT | Mod: RT

## 2025-03-08 DIAGNOSIS — S89.91XA INJURY OF RIGHT KNEE, INITIAL ENCOUNTER: ICD-10-CM

## 2025-03-08 DIAGNOSIS — M23.41 LOOSE BODY OF RIGHT KNEE: ICD-10-CM

## 2025-03-23 ENCOUNTER — HOSPITAL ENCOUNTER (OUTPATIENT)
Dept: RADIOLOGY | Facility: MEDICAL CENTER | Age: 65
End: 2025-03-23
Attending: FAMILY MEDICINE
Payer: COMMERCIAL

## 2025-03-23 DIAGNOSIS — M23.41 LOOSE BODY OF RIGHT KNEE: ICD-10-CM

## 2025-03-23 DIAGNOSIS — S89.91XA INJURY OF RIGHT KNEE, INITIAL ENCOUNTER: ICD-10-CM

## 2025-03-23 PROCEDURE — 73721 MRI JNT OF LWR EXTRE W/O DYE: CPT | Mod: RT

## 2025-03-24 ENCOUNTER — RESULTS FOLLOW-UP (OUTPATIENT)
Dept: MEDICAL GROUP | Facility: PHYSICIAN GROUP | Age: 65
End: 2025-03-24

## 2025-04-03 ENCOUNTER — APPOINTMENT (OUTPATIENT)
Dept: RADIOLOGY | Facility: MEDICAL CENTER | Age: 65
End: 2025-04-03
Attending: FAMILY MEDICINE
Payer: COMMERCIAL

## 2025-04-03 DIAGNOSIS — Z12.31 VISIT FOR SCREENING MAMMOGRAM: ICD-10-CM

## 2025-04-21 ENCOUNTER — OFFICE VISIT (OUTPATIENT)
Dept: MEDICAL GROUP | Facility: PHYSICIAN GROUP | Age: 65
End: 2025-04-21
Payer: COMMERCIAL

## 2025-04-21 VITALS
TEMPERATURE: 97.8 F | SYSTOLIC BLOOD PRESSURE: 118 MMHG | DIASTOLIC BLOOD PRESSURE: 64 MMHG | HEART RATE: 62 BPM | HEIGHT: 70 IN | OXYGEN SATURATION: 93 % | WEIGHT: 169 LBS | BODY MASS INDEX: 24.2 KG/M2

## 2025-04-21 DIAGNOSIS — S83.241A ACUTE MEDIAL MENISCUS TEAR OF RIGHT KNEE, INITIAL ENCOUNTER: ICD-10-CM

## 2025-04-21 DIAGNOSIS — M17.11 PRIMARY OSTEOARTHRITIS OF RIGHT KNEE: ICD-10-CM

## 2025-04-21 DIAGNOSIS — Z12.31 ENCOUNTER FOR SCREENING MAMMOGRAM FOR MALIGNANT NEOPLASM OF BREAST: ICD-10-CM

## 2025-04-21 DIAGNOSIS — M25.561 ACUTE PAIN OF RIGHT KNEE: ICD-10-CM

## 2025-04-21 PROBLEM — M25.562 ACUTE PAIN OF LEFT KNEE: Status: ACTIVE | Noted: 2025-04-21

## 2025-04-21 PROCEDURE — 99213 OFFICE O/P EST LOW 20 MIN: CPT | Performed by: FAMILY MEDICINE

## 2025-04-21 PROCEDURE — 3078F DIAST BP <80 MM HG: CPT | Performed by: FAMILY MEDICINE

## 2025-04-21 PROCEDURE — 3074F SYST BP LT 130 MM HG: CPT | Performed by: FAMILY MEDICINE

## 2025-05-22 ENCOUNTER — HOSPITAL ENCOUNTER (OUTPATIENT)
Dept: RADIOLOGY | Facility: MEDICAL CENTER | Age: 65
End: 2025-05-22
Attending: FAMILY MEDICINE
Payer: COMMERCIAL

## 2025-05-22 DIAGNOSIS — Z12.31 ENCOUNTER FOR SCREENING MAMMOGRAM FOR MALIGNANT NEOPLASM OF BREAST: ICD-10-CM

## 2025-05-22 PROCEDURE — 77067 SCR MAMMO BI INCL CAD: CPT

## 2025-05-27 ENCOUNTER — RESULTS FOLLOW-UP (OUTPATIENT)
Dept: MEDICAL GROUP | Facility: PHYSICIAN GROUP | Age: 65
End: 2025-05-27

## 2025-08-05 ENCOUNTER — APPOINTMENT (OUTPATIENT)
Dept: URBAN - METROPOLITAN AREA CLINIC 35 | Facility: CLINIC | Age: 65
Setting detail: DERMATOLOGY
End: 2025-08-05

## 2025-08-05 DIAGNOSIS — D18.0 HEMANGIOMA: ICD-10-CM

## 2025-08-05 DIAGNOSIS — D22 MELANOCYTIC NEVI: ICD-10-CM

## 2025-08-05 DIAGNOSIS — L72.8 OTHER FOLLICULAR CYSTS OF THE SKIN AND SUBCUTANEOUS TISSUE: ICD-10-CM

## 2025-08-05 DIAGNOSIS — Z71.89 OTHER SPECIFIED COUNSELING: ICD-10-CM

## 2025-08-05 DIAGNOSIS — L81.4 OTHER MELANIN HYPERPIGMENTATION: ICD-10-CM

## 2025-08-05 DIAGNOSIS — L82.1 OTHER SEBORRHEIC KERATOSIS: ICD-10-CM

## 2025-08-05 DIAGNOSIS — Z85.828 PERSONAL HISTORY OF OTHER MALIGNANT NEOPLASM OF SKIN: ICD-10-CM

## 2025-08-05 PROBLEM — D18.01 HEMANGIOMA OF SKIN AND SUBCUTANEOUS TISSUE: Status: ACTIVE | Noted: 2025-08-05

## 2025-08-05 PROBLEM — D23.72 OTHER BENIGN NEOPLASM OF SKIN OF LEFT LOWER LIMB, INCLUDING HIP: Status: ACTIVE | Noted: 2025-08-05

## 2025-08-05 PROBLEM — D22.5 MELANOCYTIC NEVI OF TRUNK: Status: ACTIVE | Noted: 2025-08-05

## 2025-08-05 PROCEDURE — ? SUNSCREEN RECOMMENDATIONS

## 2025-08-05 PROCEDURE — ? COUNSELING

## 2025-08-05 PROCEDURE — ? ADDITIONAL NOTES

## 2025-08-05 PROCEDURE — ? OBSERVATION

## 2025-08-05 ASSESSMENT — LOCATION DETAILED DESCRIPTION DERM
LOCATION DETAILED: RIGHT POSTERIOR EARLOBE
LOCATION DETAILED: RIGHT TRAGUS
LOCATION DETAILED: RIGHT PROXIMAL DORSAL FOREARM
LOCATION DETAILED: RIGHT INFERIOR MEDIAL MALAR CHEEK
LOCATION DETAILED: LEFT SUPERIOR LATERAL LOWER BACK
LOCATION DETAILED: RIGHT RIB CAGE
LOCATION DETAILED: RIGHT LATERAL SUPERIOR CHEST
LOCATION DETAILED: RIGHT SUPERIOR UPPER BACK

## 2025-08-05 ASSESSMENT — LOCATION SIMPLE DESCRIPTION DERM
LOCATION SIMPLE: RIGHT UPPER BACK
LOCATION SIMPLE: CHEST
LOCATION SIMPLE: RIGHT EAR
LOCATION SIMPLE: RIGHT FOREARM
LOCATION SIMPLE: RIGHT CHEEK
LOCATION SIMPLE: LEFT LOWER BACK
LOCATION SIMPLE: ABDOMEN

## 2025-08-05 ASSESSMENT — LOCATION ZONE DERM
LOCATION ZONE: ARM
LOCATION ZONE: TRUNK
LOCATION ZONE: EAR
LOCATION ZONE: FACE